# Patient Record
Sex: MALE | Race: WHITE | NOT HISPANIC OR LATINO | Employment: UNEMPLOYED
[De-identification: names, ages, dates, MRNs, and addresses within clinical notes are randomized per-mention and may not be internally consistent; named-entity substitution may affect disease eponyms.]

---

## 2023-07-07 ENCOUNTER — OFFICE VISIT (OUTPATIENT)
Dept: MEDICAL GROUP | Facility: CLINIC | Age: 53
End: 2023-07-07
Payer: MEDICAID

## 2023-07-07 ENCOUNTER — APPOINTMENT (OUTPATIENT)
Dept: MEDICAL GROUP | Facility: CLINIC | Age: 53
End: 2023-07-07
Payer: MEDICAID

## 2023-07-07 VITALS
WEIGHT: 201.7 LBS | TEMPERATURE: 97.8 F | HEIGHT: 68 IN | HEART RATE: 99 BPM | OXYGEN SATURATION: 93 % | SYSTOLIC BLOOD PRESSURE: 164 MMHG | DIASTOLIC BLOOD PRESSURE: 125 MMHG | BODY MASS INDEX: 30.57 KG/M2

## 2023-07-07 DIAGNOSIS — R53.82 CHRONIC FATIGUE: ICD-10-CM

## 2023-07-07 DIAGNOSIS — I10 ESSENTIAL HYPERTENSION: ICD-10-CM

## 2023-07-07 DIAGNOSIS — F17.200 TOBACCO USE DISORDER: ICD-10-CM

## 2023-07-07 DIAGNOSIS — F10.90 ALCOHOL USE DISORDER: ICD-10-CM

## 2023-07-07 PROCEDURE — 3077F SYST BP >= 140 MM HG: CPT | Performed by: HEALTH CARE PROVIDER

## 2023-07-07 PROCEDURE — 99203 OFFICE O/P NEW LOW 30 MIN: CPT | Mod: GE | Performed by: HEALTH CARE PROVIDER

## 2023-07-07 PROCEDURE — 3080F DIAST BP >= 90 MM HG: CPT | Performed by: HEALTH CARE PROVIDER

## 2023-07-07 RX ORDER — LOSARTAN POTASSIUM 25 MG/1
25 TABLET ORAL DAILY
Qty: 30 TABLET | Refills: 0 | Status: SHIPPED | OUTPATIENT
Start: 2023-07-07 | End: 2023-08-11

## 2023-07-07 ASSESSMENT — PATIENT HEALTH QUESTIONNAIRE - PHQ9: CLINICAL INTERPRETATION OF PHQ2 SCORE: 0

## 2023-07-07 NOTE — PROGRESS NOTES
"  [facility]    Subjective:     CC: Elevated BP / Establish Care    HPI:   Jimenez is a 53 y.o. male with no known past medical history presenting today to establish care. He was recently seen in [facility] ER on 6/25 and noted to have elevated BP as well as notable labs including elevated LFTs, elevated Cr, elevated glucose. He describes several weeks of increased fatigue and occasional lightheadedness. Since ER visit, he has decreased alcohol intake from 4-5 alcoholic beverages daily to 2-3 beverages in past 2 weeks. Long history of tobacco use, 1 ppd x 40+ years, decreased to 2-3 cigarettes daily.      Current Outpatient Medications Ordered in Epic   Medication Sig Dispense Refill    losartan (COZAAR) 25 MG Tab Take 1 Tablet by mouth every day. 30 Tablet 0     No current Epic-ordered facility-administered medications on file.         ROS:  Gen: no fevers/chills, no changes in weight  Eyes: no changes in vision  ENT: no sore throat, no hearing loss, no bloody nose  Pulm: no sob, no cough  CV: no chest pain, no palpitations  GI: no nausea/vomiting, no diarrhea  : no dysuria  MSk: no myalgias  Skin: no rash  Neuro: no headaches, no numbness/tingling  Heme/Lymph: no easy bruising      Objective:     Exam:  BP (!) 164/125 (BP Location: Right arm, Patient Position: Sitting, BP Cuff Size: Adult)   Pulse 99   Temp 36.6 °C (97.8 °F) (Temporal)   Ht 1.727 m (5' 8\")   Wt 91.5 kg (201 lb 11.2 oz)   SpO2 93%   BMI 30.67 kg/m²  Body mass index is 30.67 kg/m².    Gen:  Alert and oriented, No apparent distress.  HEENT: Neck is supple without lymphadenopathy, EOMI, no pharyngeal erythema or exudate  Lungs:  Normal effort, CTA bilaterally, no wheezes, rhonchi, or rales  CV:  Regular rate and rhythm. No murmurs, rubs, or gallops.  Abd:      Soft, non-tender, non-distended  Ext:  No clubbing, cyanosis, edema.      Labs:     No results found for this or any previous visit.      Assessment & Plan:     53 y.o. male " with the following -     Problem List Items Addressed This Visit       Essential hypertension     Presents with elevated /125 and multiple home readings with DBP > 110. Notes fatigue and occasional lightheadedness but no headache, vision changes, weakness. Will initiate losartan 25 mg at this time, did not tolerate informal trial of lisinopril in recent weeks. Recommend continued home BP measurements with goal less than 140/90. Will RTC in 2 weeks for continued titration and review of lab results.         Relevant Medications    losartan (COZAAR) 25 MG Tab    Other Relevant Orders    CBC WITH DIFFERENTIAL    Comp Metabolic Panel    TSH WITH REFLEX TO FT4    HEMOGLOBIN A1C    Lipid Profile    Chronic fatigue     Notes recent exacerbation of fatigue. May be secondary to persistent HTN but will perform further investigation including CBC, CMP, A1c, TSH and recommend screening colonoscopy. Will review labs at next appt in 2 weeks.              Follow up in 2 weeks      Bin Rodriguez MD, [place]  [facility]  PGY-3

## 2023-07-07 NOTE — ASSESSMENT & PLAN NOTE
Notes recent exacerbation of fatigue. May be secondary to persistent HTN but will perform further investigation including CBC, CMP, A1c, TSH and recommend screening colonoscopy. Will review labs at next appt in 2 weeks.

## 2023-07-07 NOTE — LETTER
[facility]     July 7, 2023    Patient: Jimenez Gandara   YOB: 1970   Date of Visit: 7/7/2023       To Whom It May Concern:    Jimenez Gandara was seen and treated in our department on 7/7/2023. He is appropriate to return to work at this time. No operating heavy machinery. Lifting heavy items appropriate as tolerated.    Sincerely,     Bin Rodriguez M.D.

## 2023-07-07 NOTE — ASSESSMENT & PLAN NOTE
Presents with elevated /125 and multiple home readings with DBP > 110. Notes fatigue and occasional lightheadedness but no headache, vision changes, weakness. Will initiate losartan 25 mg at this time, did not tolerate informal trial of lisinopril in recent weeks. Recommend continued home BP measurements with goal less than 140/90. Will RTC in 2 weeks for continued titration and review of lab results.

## 2023-07-11 LAB — HBA1C MFR BLD: 6.3 % (ref ?–5.8)

## 2023-07-14 ENCOUNTER — OFFICE VISIT (OUTPATIENT)
Dept: MEDICAL GROUP | Facility: CLINIC | Age: 53
End: 2023-07-14
Payer: MEDICAID

## 2023-07-14 VITALS
RESPIRATION RATE: 16 BRPM | OXYGEN SATURATION: 91 % | HEART RATE: 96 BPM | DIASTOLIC BLOOD PRESSURE: 137 MMHG | TEMPERATURE: 97 F | BODY MASS INDEX: 30.31 KG/M2 | WEIGHT: 200 LBS | HEIGHT: 68 IN | SYSTOLIC BLOOD PRESSURE: 181 MMHG

## 2023-07-14 DIAGNOSIS — F10.90 ALCOHOL USE DISORDER: ICD-10-CM

## 2023-07-14 DIAGNOSIS — I10 HYPERTENSION, UNSPECIFIED TYPE: ICD-10-CM

## 2023-07-14 DIAGNOSIS — F17.200 TOBACCO USE DISORDER: ICD-10-CM

## 2023-07-14 DIAGNOSIS — R55 SYNCOPE, UNSPECIFIED SYNCOPE TYPE: ICD-10-CM

## 2023-07-14 DIAGNOSIS — N28.9 RENAL INSUFFICIENCY: ICD-10-CM

## 2023-07-14 DIAGNOSIS — R42 LIGHT HEADEDNESS: ICD-10-CM

## 2023-07-14 PROCEDURE — 99215 OFFICE O/P EST HI 40 MIN: CPT | Mod: GC

## 2023-07-14 PROCEDURE — 3077F SYST BP >= 140 MM HG: CPT | Mod: GC

## 2023-07-14 PROCEDURE — 3080F DIAST BP >= 90 MM HG: CPT | Mod: GC

## 2023-07-14 NOTE — PROGRESS NOTES
This note is formatted in an APSO format, for additional subjective and objective evaluation please scroll to the bottom of the note.    CC:  Chief Complaint   Patient presents with    Hypertension    Lab Results     Syncopal episode on Monday   Assessment/Plan:  Problem List Items Addressed This Visit       Hypertension     Pt has possibly chronic hypertension, with 20 point increased BP on left compared to right arm. Recent episode of syncope and recent light headedness. DDx essential HTN, secondary HTN, subclavian steal syndrome.  - Will keep losartan 25 due to very high BP  - Not adding another agent today because we cannot completely rule out orthostatic hypotension based on syncopal history.  - ER precautions given  - Pt will hold losartan for SBP below 140 for now.  - Referral to cardiology         Alcohol use disorder     No clear signs of withdrawal 4 days after his last drink. AST and ALT slightly elevated, but PLT, bili normal.         Tobacco use disorder    Syncope     Per patient's history, there seem to be aspects of orthostatic hypotension, or possible vasovagal syncope. However, he has been having increasing light-headedness with exertion which is concerning for cardiac etiology. EKG in the clinic was reassuring. Pt also has apparently chronic hypertension but there is a significant difference of about 20 points systolic BP between the two arms. He reports some calf cramping at night which started as soon as he started taking losartan, but no leg swelling, no new cough with the leg cramping, and patient denies ever having chest pain. Wells score is zero; unlikely PE. No signs of seizure based on HPI, no personal or family hx of seizure.  DDx includes orthostatic hypotension, vasovagal syncope, cardiomyopathy/HF/structural heart disorder, subclavian steal syndrome, COPD.  -ER precautions reviewed with patient  -Echocardiogram ordered  -Consider CT chest, although pt does have Cr=1.8 recent labs and  no baseline.  -Urgent referral to cardiology placed         Renal insufficiency     Elevated Cr in context of HTN. Unknown if it is chronic because we have no baseline Cr.  - Continue to trend Cr         Light headedness     Started about a month ago. Unclear etiology. Please see syncope.           No orders of the defined types were placed in this encounter.      Return in about 1 month (around 8/14/2023).      LABS: Results reviewed and discussed with the patient, questions answered.    HISTORY OF PRESENT ILLNESS: Patient is a 53 y.o. male established patient who presents today with:    Problem   Syncope    Pt had an episode of syncope on 7/10/23 for the first time. He reports that he has been feeling light headed for about a month, especially when he stands up or moves around a lot. He was at work on 7/10, feeling very lightheaded. He lifted a heavy object up and felt his vision go white and increased light-headedness, then he passed out. It was unwitnessed but pt believes that it only lasted a couple seconds. He was not confused upon awakening and went back to work.     Renal Insufficiency    Cr=1.8 /  GFR=42 July 2022. Unclear if EVELYN or CKD.     Light Headedness   Hypertension    Pt with hypertension, likely chronic. Takes BP at home, with SBP ranging 160-200. Home recordings as well as in-clinic measurement show ~20 points higher in the left arm than the right.    Currently HTN poorly controlled with losartan 25.     Alcohol Use Disorder    Pt reports he was a heavy drinker but reduced intake in June 2022. He reports that he quit completed on July 10 2022 after his episode of syncope.     Tobacco Use Disorder    Pt reports he has cut down to 2-3 per day. Started smoking at 13 y/o.         1. Syncope, unspecified syncope type        2. Hypertension, unspecified type        3. Renal insufficiency        4. Alcohol use disorder        5. Tobacco use disorder        6. Light headedness            Patient Active  "Problem List    Diagnosis Date Noted    Syncope 07/14/2023    Renal insufficiency 07/14/2023    Light headedness 07/14/2023    Hypertension 07/07/2023    Chronic fatigue 07/07/2023    Alcohol use disorder 07/07/2023    Tobacco use disorder 07/07/2023      Allergies:Patient has no known allergies.    Current Outpatient Medications   Medication Sig Dispense Refill    losartan (COZAAR) 25 MG Tab Take 1 Tablet by mouth every day. 30 Tablet 0     No current facility-administered medications for this visit.       Social History     Tobacco Use    Smoking status: Every Day     Types: Cigarettes    Smokeless tobacco: Never    Tobacco comments:     3 to 4 cig a day   Vaping Use    Vaping Use: Never used   Substance Use Topics    Alcohol use: Yes     Comment: 4 or 5 shots of liquor a day    Drug use: Never     Social History     Social History Narrative    Not on file       No family history on file.    Exam:    BP (!) 181/137 (BP Location: Left arm, Patient Position: Sitting, BP Cuff Size: Large adult)   Pulse 96   Temp 36.1 °C (97 °F) (Temporal)   Resp 16   Ht 1.727 m (5' 8\")   Wt 90.7 kg (200 lb)   SpO2 91%   BMI 30.41 kg/m²  Body mass index is 30.41 kg/m².    Gen: Alert and oriented, No apparent distress. Well developed  HEENT: NCAT, MMM  Neck: Supple, FROM  Chest: No deformities, Equal chest expansion  Lungs: Normal effort, CTA bilaterally, no wheezes, rhonchi, or rales  CV: Regular rate and rhythm. No murmurs, rubs, or gallops. Pulse palpable  Abd: Non-distended  Ext: No clubbing, cyanosis, edema.  Skin: Warm/dry, without rashes  Neuro: A/O x 4, CN 2-12 Grossly intact, Motor/sensory grossly intact  Psych: Normal behavior, normal affect      Christiano Almazan MD  [facility] Resident    "

## 2023-07-15 NOTE — ASSESSMENT & PLAN NOTE
Elevated Cr in context of HTN. Unknown if it is chronic because we have no baseline Cr.  - Continue to trend Cr

## 2023-07-15 NOTE — ASSESSMENT & PLAN NOTE
No clear signs of withdrawal 4 days after his last drink. AST and ALT slightly elevated, but PLT, bili normal.

## 2023-07-15 NOTE — ASSESSMENT & PLAN NOTE
Pt has possibly chronic hypertension, with 20 point increased BP on left compared to right arm. Recent episode of syncope and recent light headedness. DDx essential HTN, secondary HTN, subclavian steal syndrome.  - Will keep losartan 25 due to very high BP  - Not adding another agent today because we cannot completely rule out orthostatic hypotension based on syncopal history.  - ER precautions given  - Pt will hold losartan for SBP below 140 for now.  - Referral to cardiology

## 2023-07-21 ENCOUNTER — TELEPHONE (OUTPATIENT)
Dept: MEDICAL GROUP | Facility: CLINIC | Age: 53
End: 2023-07-21

## 2023-07-21 NOTE — TELEPHONE ENCOUNTER
Spoke with Glynn and he is feeling lightheaded and dizzy. He only took half a pill. I told him to discontinue taking his medication. An appointment was made for this coming Thursday to see Dr. Aguirre.

## 2023-07-21 NOTE — TELEPHONE ENCOUNTER
Caller Name: Jimenez Gandara   Call Back Number: [phone]     How would the patient prefer to be contacted with a response: Phone call OK to leave a detailed message    Glynn  was instructed to stop taking his Losartan if his BP is under 130. Yesterday on his Right arm it was 126/96 and left arm was 108/82. He doesn't know what to do next. I let him know that Dr. Almazan is out of clinic today, I would ask Dr. Morales for advice and call him back.

## 2023-07-24 ENCOUNTER — OFFICE VISIT (OUTPATIENT)
Dept: MEDICAL GROUP | Facility: CLINIC | Age: 53
End: 2023-07-24
Payer: MEDICAID

## 2023-07-24 VITALS
BODY MASS INDEX: 31.07 KG/M2 | HEIGHT: 68 IN | DIASTOLIC BLOOD PRESSURE: 80 MMHG | SYSTOLIC BLOOD PRESSURE: 132 MMHG | OXYGEN SATURATION: 96 % | HEART RATE: 90 BPM | RESPIRATION RATE: 14 BRPM | TEMPERATURE: 96.7 F | WEIGHT: 205 LBS

## 2023-07-24 DIAGNOSIS — N28.9 RENAL INSUFFICIENCY: ICD-10-CM

## 2023-07-24 DIAGNOSIS — I10 HYPERTENSION, UNSPECIFIED TYPE: ICD-10-CM

## 2023-07-24 DIAGNOSIS — R42 DIZZINESS: ICD-10-CM

## 2023-07-24 DIAGNOSIS — R42 LIGHT HEADEDNESS: ICD-10-CM

## 2023-07-24 PROCEDURE — 99214 OFFICE O/P EST MOD 30 MIN: CPT | Mod: GC

## 2023-07-24 PROCEDURE — 3079F DIAST BP 80-89 MM HG: CPT | Mod: GC

## 2023-07-24 PROCEDURE — 3075F SYST BP GE 130 - 139MM HG: CPT | Mod: GC

## 2023-07-24 NOTE — PROGRESS NOTES
"Subjective:     CC: Follow-up dizziness and high blood pressure    HPI:   Jimenez presents today with for follow-up on 2-week episode of dizziness and very high blood pressure.    Problem   Renal Insufficiency    Cr=1.8  July 2023. Unclear if EVELYN or CKD.     Light Headedness    Patient went to [facility] ER at the end of June 2023 after dizziness and syncopal episode.  Patient has undergone echo with Dr. Peoples, cardiology.  Patient would like those results.  Patient continues to have dizziness was primarily when he stands up, it also occurs when he moves his head and at times when he is just turning around in bed. .  Attests to SOB, denies leg swelling.      Hypertension    Patient syncopal episode was likely hypertensive emergency at the end of June 2023.   Patient has a blood pressure log, it ranges from 108/82-1 69/21.  Left arm is often 5-40 systolic higher.  Patient has only been taking half of his losartan 25 mg due to dizziness concerns         Social Hx: Used to be heavy drinker, has now stopped drinking.    Work: Works in construction, currently laid off due to syncopal episode, pursuing symptom resolution.    Current Outpatient Medications Ordered in Epic   Medication Sig Dispense Refill    losartan (COZAAR) 25 MG Tab Take 1 Tablet by mouth every day. 30 Tablet 0     No current Epic-ordered facility-administered medications on file.       History reviewed. No pertinent past medical history.     History reviewed. No pertinent surgical history.     History reviewed. No pertinent family history.         Objective:     Exam:  /80 (BP Location: Right arm, Patient Position: Standing, BP Cuff Size: Large adult)   Pulse 90   Temp 35.9 °C (96.7 °F) (Temporal)   Resp 14   Ht 1.727 m (5' 8\")   Wt 93 kg (205 lb)   SpO2 96%   BMI 31.17 kg/m²  Body mass index is 31.17 kg/m².    Gen: Alert and oriented, No apparent distress.  Head:  NCAT, EOMI, sclera clear without discharge  Neck: Neck is supple without " lymphadenopathy.  Lungs: Normal effort, CTA bilaterally, no wheezes, rhonchi, or rales  CV: Slightly distant heart sounds.  Regular rate and rhythm. No murmurs, rubs, or gallops.  Abd:   Non-distended, soft  Ext: No clubbing, cyanosis, edema.  MSK: Unassisted gait  Derm: No lesions on exposed skin  Psych: normal mood and affect        Assessment & Plan:     53 y.o. male with the following -     Problem List Items Addressed This Visit       Hypertension     - Recommend patient take a full 25 mg losartan, may follow-up with Dr. Chowdhury for this  -Continue BP log.          Renal insufficiency     Repeat labs given to patient to perform in 1 month.         Light headedness     Echo done 7/23/2023 showed EF 35 to 40% and elevated right pulmonary artery pressure of 50 mmHg.  Bilateral carotid ultrasound showed only mild stenosis (under 50%) on right.  Orthostatics done today and showed systolic drop of 148 to 130 from lying to standing.  Assuming most likely cause is mild HFrEF at this point.    - Follow-up with Dr. Chowdhury on 7/27.  - Instructed patient to address possible tilt table test and/or meclizine with Dr. Chowdhury  -Instructed patient  -Instructed pt to aim to drink at least 3 L of water a day and begin to wear compression stockings to assess for improvement of symptoms.  -vestibular rehab referral given to pt to pursue, may possibly provide some relief for dizziness with head motion component.           Other Visit Diagnoses       Dizziness        Relevant Orders    CBC WITH DIFFERENTIAL    Comp Metabolic Panel    TSH WITH REFLEX TO FT4    Vestibular Rehab            Follow-up: With myself or another provider in 1 month.      Florence Aguirre D.O.  PGY-2    Please note that this dictation was created using voice recognition software. I have made every reasonable attempt to correct obvious errors, but I expect that there are errors of grammar and possibly content that I did not discover before finalizing the note.

## 2023-07-25 NOTE — ASSESSMENT & PLAN NOTE
Echo done 7/23/2023 showed EF 35 to 40% and elevated right pulmonary artery pressure of 50 mmHg.  Bilateral carotid ultrasound showed only mild stenosis (under 50%) on right.  Orthostatics done today and showed systolic drop of 148 to 130 from lying to standing.  Assuming most likely cause is mild HFrEF at this point.    - Follow-up with Dr. Chowdhury on 7/27.  - Instructed patient to address possible tilt table test and/or meclizine with Dr. Chowdhury  -Instructed patient  -Instructed pt to aim to drink at least 3 L of water a day and begin to wear compression stockings to assess for improvement of symptoms.  -vestibular rehab referral given to pt to pursue, may possibly provide some relief for dizziness with head motion component.

## 2023-07-25 NOTE — ASSESSMENT & PLAN NOTE
- Recommend patient take a full 25 mg losartan, may follow-up with Dr. Chowdhury for this  -Continue BP log.

## 2023-07-27 ENCOUNTER — APPOINTMENT (OUTPATIENT)
Dept: MEDICAL GROUP | Facility: CLINIC | Age: 53
End: 2023-07-27

## 2023-11-20 ENCOUNTER — OFFICE VISIT (OUTPATIENT)
Dept: CARDIOLOGY | Facility: MEDICAL CENTER | Age: 53
End: 2023-11-20
Attending: INTERNAL MEDICINE
Payer: MEDICAID

## 2023-11-20 VITALS
HEART RATE: 105 BPM | OXYGEN SATURATION: 94 % | DIASTOLIC BLOOD PRESSURE: 64 MMHG | WEIGHT: 207 LBS | HEIGHT: 68 IN | BODY MASS INDEX: 31.37 KG/M2 | RESPIRATION RATE: 18 BRPM | SYSTOLIC BLOOD PRESSURE: 122 MMHG

## 2023-11-20 DIAGNOSIS — I25.10 CORONARY ARTERY DISEASE DUE TO LIPID RICH PLAQUE: ICD-10-CM

## 2023-11-20 DIAGNOSIS — F10.11 ALCOHOL ABUSE, IN REMISSION: ICD-10-CM

## 2023-11-20 DIAGNOSIS — I50.9 ACC/AHA STAGE C CONGESTIVE HEART FAILURE DUE TO ISCHEMIC CARDIOMYOPATHY (HCC): ICD-10-CM

## 2023-11-20 DIAGNOSIS — I25.83 CORONARY ARTERY DISEASE DUE TO LIPID RICH PLAQUE: ICD-10-CM

## 2023-11-20 DIAGNOSIS — Z72.0 TOBACCO USE: ICD-10-CM

## 2023-11-20 DIAGNOSIS — I25.5 ACC/AHA STAGE C CONGESTIVE HEART FAILURE DUE TO ISCHEMIC CARDIOMYOPATHY (HCC): ICD-10-CM

## 2023-11-20 DIAGNOSIS — R42 LIGHT HEADEDNESS: ICD-10-CM

## 2023-11-20 DIAGNOSIS — I10 HYPERTENSION, UNSPECIFIED TYPE: ICD-10-CM

## 2023-11-20 LAB — EKG IMPRESSION: NORMAL

## 2023-11-20 PROCEDURE — 99205 OFFICE O/P NEW HI 60 MIN: CPT | Performed by: INTERNAL MEDICINE

## 2023-11-20 PROCEDURE — 99202 OFFICE O/P NEW SF 15 MIN: CPT | Performed by: INTERNAL MEDICINE

## 2023-11-20 PROCEDURE — 3074F SYST BP LT 130 MM HG: CPT | Performed by: INTERNAL MEDICINE

## 2023-11-20 PROCEDURE — 3078F DIAST BP <80 MM HG: CPT | Performed by: INTERNAL MEDICINE

## 2023-11-20 PROCEDURE — 93010 ELECTROCARDIOGRAM REPORT: CPT | Performed by: INTERNAL MEDICINE

## 2023-11-20 PROCEDURE — 93005 ELECTROCARDIOGRAM TRACING: CPT | Performed by: INTERNAL MEDICINE

## 2023-11-20 RX ORDER — METOPROLOL SUCCINATE 25 MG/1
12.5 TABLET, EXTENDED RELEASE ORAL DAILY
Qty: 50 TABLET | Refills: 3 | Status: SHIPPED | OUTPATIENT
Start: 2023-11-20 | End: 2024-03-01

## 2023-11-20 RX ORDER — POTASSIUM CHLORIDE 750 MG/1
10 TABLET, FILM COATED, EXTENDED RELEASE ORAL
Qty: 30 TABLET | Refills: 3 | Status: SHIPPED | OUTPATIENT
Start: 2023-11-20 | End: 2024-03-01 | Stop reason: SDUPTHER

## 2023-11-20 RX ORDER — ATORVASTATIN CALCIUM 40 MG/1
TABLET, FILM COATED ORAL
COMMUNITY

## 2023-11-20 RX ORDER — FUROSEMIDE 20 MG/1
20 TABLET ORAL DAILY
COMMUNITY
Start: 2023-10-12 | End: 2023-11-20 | Stop reason: SDUPTHER

## 2023-11-20 RX ORDER — CLOPIDOGREL BISULFATE 75 MG/1
75 TABLET ORAL DAILY
COMMUNITY

## 2023-11-20 RX ORDER — POTASSIUM CHLORIDE 750 MG/1
1 TABLET, FILM COATED, EXTENDED RELEASE ORAL 2 TIMES DAILY
COMMUNITY
Start: 2023-11-13 | End: 2023-11-20 | Stop reason: SDUPTHER

## 2023-11-20 RX ORDER — ASPIRIN 81 MG/1
81 TABLET ORAL DAILY
Qty: 100 TABLET | Refills: 3 | Status: SHIPPED | OUTPATIENT
Start: 2023-11-20

## 2023-11-20 RX ORDER — LOSARTAN POTASSIUM 25 MG/1
25 TABLET ORAL EVERY EVENING
Qty: 90 TABLET | Refills: 3 | Status: ON HOLD | OUTPATIENT
Start: 2023-11-20 | End: 2023-12-11

## 2023-11-20 RX ORDER — FUROSEMIDE 20 MG/1
20 TABLET ORAL
Qty: 30 TABLET | Refills: 3 | Status: SHIPPED | OUTPATIENT
Start: 2023-11-20 | End: 2024-03-01 | Stop reason: SDUPTHER

## 2023-11-20 ASSESSMENT — ENCOUNTER SYMPTOMS
PALPITATIONS: 0
BLURRED VISION: 0
CLAUDICATION: 0
ORTHOPNEA: 0
IRREGULAR HEARTBEAT: 0
SYNCOPE: 0
DYSPNEA ON EXERTION: 0
NEAR-SYNCOPE: 0
BACK PAIN: 0
NAUSEA: 0
WEIGHT LOSS: 0
ALTERED MENTAL STATUS: 0
VOMITING: 0
DECREASED APPETITE: 0
FEVER: 0
PND: 0
DEPRESSION: 0
ABDOMINAL PAIN: 0
SHORTNESS OF BREATH: 0
FLANK PAIN: 0
WEIGHT GAIN: 0
DIZZINESS: 0
CONSTIPATION: 0
COUGH: 0
HEARTBURN: 0
DIARRHEA: 0

## 2023-11-20 ASSESSMENT — FIBROSIS 4 INDEX: FIB4 SCORE: 1.87

## 2023-11-30 ENCOUNTER — TELEPHONE (OUTPATIENT)
Dept: CARDIOLOGY | Facility: MEDICAL CENTER | Age: 53
End: 2023-11-30
Payer: MEDICAID

## 2023-12-01 NOTE — TELEPHONE ENCOUNTER
Phone Number Called: [phone]    Call outcome: Spoke to patient regarding message below.    Message: Called to follow up with patient regarding symptoms.    Patient states he is not feeling better than when he came in and saw VR on 11/20. BP in the AM averaging 140/90's. Patient HR has been in the 80's-90's. Every time patient stands up he gets dizzy. He can walk 15-20 feet and then he has to stop and catch his breath. Patient denies chest pain and palpitations with dyspnea on exertion. Any activity makes him short of breath. Patient confirms the losartan 25 MG dialy. Metoprolol 12.5 MG NOC.     Patient states he had some swelling and he took the water pill for a couple of days and it improved. Patient does not weight himself each day. Patient does not feel like he has any fluid in the abdomen or legs. He does feel like he has some bloating. Patient states he has cut back on his water somewhat, but he drinks 3-4 16oz bottles of water per day.     To VR: Please advise  
Phone Number Called: [phone]    Call outcome: Spoke to patient regarding message below.    Message: Called to inform patient of VR recommendations. Discussed with patient and friend that there are many things the ER can rule out that we cannot get done easily. Patient and friend verbalize understanding.    
Tried to call him but no answer. I don't see his repeat blood work. Probably best served in emergency room. He needs closer monitoring vitals, fluid status, and labs. Thanks Lillian!  
VR        Caller: Liss(pt's friend)    Topic/issue: Patient was in on 11/20 and was prescribed new medications and he is still experiencing fatigue, shortness of breath and dizziness as well even with the new medications. She was concerned and was asking for a call back    Callback Number: [phone]        Thank you    -Pedrito PHILLIPS    
Tear meniscus knee

## 2023-12-10 ENCOUNTER — APPOINTMENT (OUTPATIENT)
Dept: RADIOLOGY | Facility: MEDICAL CENTER | Age: 53
DRG: 291 | End: 2023-12-10
Attending: EMERGENCY MEDICINE
Payer: MEDICAID

## 2023-12-10 ENCOUNTER — HOSPITAL ENCOUNTER (INPATIENT)
Facility: MEDICAL CENTER | Age: 53
LOS: 1 days | DRG: 291 | End: 2023-12-11
Attending: EMERGENCY MEDICINE | Admitting: FAMILY MEDICINE
Payer: MEDICAID

## 2023-12-10 ENCOUNTER — APPOINTMENT (OUTPATIENT)
Dept: CARDIOLOGY | Facility: MEDICAL CENTER | Age: 53
DRG: 291 | End: 2023-12-10
Attending: INTERNAL MEDICINE
Payer: MEDICAID

## 2023-12-10 DIAGNOSIS — I50.9 ACUTE ON CHRONIC CONGESTIVE HEART FAILURE, UNSPECIFIED HEART FAILURE TYPE (HCC): ICD-10-CM

## 2023-12-10 DIAGNOSIS — R09.02 HYPOXIA: ICD-10-CM

## 2023-12-10 PROBLEM — N18.2 STAGE 2 CHRONIC KIDNEY DISEASE: Status: ACTIVE | Noted: 2023-07-14

## 2023-12-10 LAB
ALBUMIN SERPL BCP-MCNC: 4.1 G/DL (ref 3.2–4.9)
ALBUMIN/GLOB SERPL: 1.5 G/DL
ALP SERPL-CCNC: 103 U/L (ref 30–99)
ALT SERPL-CCNC: 47 U/L (ref 2–50)
AMPHET UR QL SCN: POSITIVE
ANION GAP SERPL CALC-SCNC: 13 MMOL/L (ref 7–16)
AST SERPL-CCNC: 41 U/L (ref 12–45)
BARBITURATES UR QL SCN: NEGATIVE
BASOPHILS # BLD AUTO: 0.6 % (ref 0–1.8)
BASOPHILS # BLD: 0.05 K/UL (ref 0–0.12)
BENZODIAZ UR QL SCN: NEGATIVE
BILIRUB SERPL-MCNC: 0.9 MG/DL (ref 0.1–1.5)
BUN SERPL-MCNC: 22 MG/DL (ref 8–22)
BZE UR QL SCN: NEGATIVE
CALCIUM ALBUM COR SERPL-MCNC: 9 MG/DL (ref 8.5–10.5)
CALCIUM SERPL-MCNC: 9.1 MG/DL (ref 8.5–10.5)
CANNABINOIDS UR QL SCN: NEGATIVE
CHLORIDE SERPL-SCNC: 104 MMOL/L (ref 96–112)
CO2 SERPL-SCNC: 21 MMOL/L (ref 20–33)
CREAT SERPL-MCNC: 1.5 MG/DL (ref 0.5–1.4)
CREAT UR-MCNC: 51.43 MG/DL
D DIMER PPP IA.FEU-MCNC: 0.56 UG/ML (FEU) (ref 0–0.5)
EKG IMPRESSION: NORMAL
EOSINOPHIL # BLD AUTO: 0.17 K/UL (ref 0–0.51)
EOSINOPHIL NFR BLD: 2 % (ref 0–6.9)
ERYTHROCYTE [DISTWIDTH] IN BLOOD BY AUTOMATED COUNT: 45.3 FL (ref 35.9–50)
FENTANYL UR QL: NEGATIVE
GFR SERPLBLD CREATININE-BSD FMLA CKD-EPI: 54 ML/MIN/1.73 M 2
GLOBULIN SER CALC-MCNC: 2.7 G/DL (ref 1.9–3.5)
GLUCOSE SERPL-MCNC: 185 MG/DL (ref 65–99)
HCT VFR BLD AUTO: 47.3 % (ref 42–52)
HGB BLD-MCNC: 16.2 G/DL (ref 14–18)
IMM GRANULOCYTES # BLD AUTO: 0.04 K/UL (ref 0–0.11)
IMM GRANULOCYTES NFR BLD AUTO: 0.5 % (ref 0–0.9)
LV EJECT FRACT  99904: 59
LV EJECT FRACT MOD 2C 99903: 54.87
LV EJECT FRACT MOD 4C 99902: 60.53
LV EJECT FRACT MOD BP 99901: 58.89
LYMPHOCYTES # BLD AUTO: 2.01 K/UL (ref 1–4.8)
LYMPHOCYTES NFR BLD: 23.8 % (ref 22–41)
MCH RBC QN AUTO: 29.2 PG (ref 27–33)
MCHC RBC AUTO-ENTMCNC: 34.2 G/DL (ref 32.3–36.5)
MCV RBC AUTO: 85.2 FL (ref 81.4–97.8)
METHADONE UR QL SCN: NEGATIVE
MICROALBUMIN UR-MCNC: 8.5 MG/DL
MICROALBUMIN/CREAT UR: 165 MG/G (ref 0–30)
MONOCYTES # BLD AUTO: 0.86 K/UL (ref 0–0.85)
MONOCYTES NFR BLD AUTO: 10.2 % (ref 0–13.4)
NEUTROPHILS # BLD AUTO: 5.33 K/UL (ref 1.82–7.42)
NEUTROPHILS NFR BLD: 62.9 % (ref 44–72)
NRBC # BLD AUTO: 0 K/UL
NRBC BLD-RTO: 0 /100 WBC (ref 0–0.2)
NT-PROBNP SERPL IA-MCNC: 3294 PG/ML (ref 0–125)
OPIATES UR QL SCN: NEGATIVE
OXYCODONE UR QL SCN: NEGATIVE
PCP UR QL SCN: NEGATIVE
PLATELET # BLD AUTO: 176 K/UL (ref 164–446)
PMV BLD AUTO: 11.2 FL (ref 9–12.9)
POTASSIUM SERPL-SCNC: 4 MMOL/L (ref 3.6–5.5)
PROPOXYPH UR QL SCN: NEGATIVE
PROT SERPL-MCNC: 6.8 G/DL (ref 6–8.2)
RBC # BLD AUTO: 5.55 M/UL (ref 4.7–6.1)
SODIUM SERPL-SCNC: 138 MMOL/L (ref 135–145)
TROPONIN T SERPL-MCNC: 21 NG/L (ref 6–19)
TROPONIN T SERPL-MCNC: 25 NG/L (ref 6–19)
WBC # BLD AUTO: 8.5 K/UL (ref 4.8–10.8)

## 2023-12-10 PROCEDURE — 770020 HCHG ROOM/CARE - TELE (206)

## 2023-12-10 PROCEDURE — A9270 NON-COVERED ITEM OR SERVICE: HCPCS | Performed by: HEALTH CARE PROVIDER

## 2023-12-10 PROCEDURE — 80307 DRUG TEST PRSMV CHEM ANLYZR: CPT

## 2023-12-10 PROCEDURE — 82043 UR ALBUMIN QUANTITATIVE: CPT

## 2023-12-10 PROCEDURE — 85379 FIBRIN DEGRADATION QUANT: CPT

## 2023-12-10 PROCEDURE — 93306 TTE W/DOPPLER COMPLETE: CPT

## 2023-12-10 PROCEDURE — 84484 ASSAY OF TROPONIN QUANT: CPT

## 2023-12-10 PROCEDURE — 93306 TTE W/DOPPLER COMPLETE: CPT | Mod: 26 | Performed by: INTERNAL MEDICINE

## 2023-12-10 PROCEDURE — 96374 THER/PROPH/DIAG INJ IV PUSH: CPT

## 2023-12-10 PROCEDURE — 93005 ELECTROCARDIOGRAM TRACING: CPT | Performed by: EMERGENCY MEDICINE

## 2023-12-10 PROCEDURE — 71045 X-RAY EXAM CHEST 1 VIEW: CPT

## 2023-12-10 PROCEDURE — 85025 COMPLETE CBC W/AUTO DIFF WBC: CPT

## 2023-12-10 PROCEDURE — 99254 IP/OBS CNSLTJ NEW/EST MOD 60: CPT | Performed by: INTERNAL MEDICINE

## 2023-12-10 PROCEDURE — 83880 ASSAY OF NATRIURETIC PEPTIDE: CPT

## 2023-12-10 PROCEDURE — 700102 HCHG RX REV CODE 250 W/ 637 OVERRIDE(OP): Performed by: HEALTH CARE PROVIDER

## 2023-12-10 PROCEDURE — A9270 NON-COVERED ITEM OR SERVICE: HCPCS | Performed by: INTERNAL MEDICINE

## 2023-12-10 PROCEDURE — 82570 ASSAY OF URINE CREATININE: CPT

## 2023-12-10 PROCEDURE — 96375 TX/PRO/DX INJ NEW DRUG ADDON: CPT

## 2023-12-10 PROCEDURE — 700111 HCHG RX REV CODE 636 W/ 250 OVERRIDE (IP): Mod: JZ,UD | Performed by: INTERNAL MEDICINE

## 2023-12-10 PROCEDURE — 93005 ELECTROCARDIOGRAM TRACING: CPT

## 2023-12-10 PROCEDURE — 99285 EMERGENCY DEPT VISIT HI MDM: CPT

## 2023-12-10 PROCEDURE — 80053 COMPREHEN METABOLIC PANEL: CPT

## 2023-12-10 PROCEDURE — 36415 COLL VENOUS BLD VENIPUNCTURE: CPT

## 2023-12-10 PROCEDURE — 700102 HCHG RX REV CODE 250 W/ 637 OVERRIDE(OP): Performed by: INTERNAL MEDICINE

## 2023-12-10 RX ORDER — ATORVASTATIN CALCIUM 40 MG/1
40 TABLET, FILM COATED ORAL EVERY EVENING
Status: DISCONTINUED | OUTPATIENT
Start: 2023-12-10 | End: 2023-12-11 | Stop reason: HOSPADM

## 2023-12-10 RX ORDER — ASPIRIN 81 MG/1
81 TABLET ORAL DAILY
Status: DISCONTINUED | OUTPATIENT
Start: 2023-12-11 | End: 2023-12-11 | Stop reason: HOSPADM

## 2023-12-10 RX ORDER — HYDRALAZINE HYDROCHLORIDE 20 MG/ML
10 INJECTION INTRAMUSCULAR; INTRAVENOUS EVERY 4 HOURS PRN
Status: DISCONTINUED | OUTPATIENT
Start: 2023-12-10 | End: 2023-12-11 | Stop reason: HOSPADM

## 2023-12-10 RX ORDER — BISACODYL 10 MG
10 SUPPOSITORY, RECTAL RECTAL
Status: DISCONTINUED | OUTPATIENT
Start: 2023-12-10 | End: 2023-12-11 | Stop reason: HOSPADM

## 2023-12-10 RX ORDER — ENOXAPARIN SODIUM 100 MG/ML
40 INJECTION SUBCUTANEOUS DAILY
Status: DISCONTINUED | OUTPATIENT
Start: 2023-12-10 | End: 2023-12-11 | Stop reason: HOSPADM

## 2023-12-10 RX ORDER — HYDRALAZINE HYDROCHLORIDE 20 MG/ML
20 INJECTION INTRAMUSCULAR; INTRAVENOUS ONCE
Status: COMPLETED | OUTPATIENT
Start: 2023-12-10 | End: 2023-12-10

## 2023-12-10 RX ORDER — ACETAMINOPHEN 325 MG/1
650 TABLET ORAL EVERY 6 HOURS PRN
Status: DISCONTINUED | OUTPATIENT
Start: 2023-12-10 | End: 2023-12-11 | Stop reason: HOSPADM

## 2023-12-10 RX ORDER — ONDANSETRON 2 MG/ML
4 INJECTION INTRAMUSCULAR; INTRAVENOUS EVERY 4 HOURS PRN
Status: DISCONTINUED | OUTPATIENT
Start: 2023-12-10 | End: 2023-12-11 | Stop reason: HOSPADM

## 2023-12-10 RX ORDER — NICOTINE 21 MG/24HR
14 PATCH, TRANSDERMAL 24 HOURS TRANSDERMAL
Status: DISCONTINUED | OUTPATIENT
Start: 2023-12-11 | End: 2023-12-10

## 2023-12-10 RX ORDER — NICOTINE 21 MG/24HR
14 PATCH, TRANSDERMAL 24 HOURS TRANSDERMAL
Status: DISCONTINUED | OUTPATIENT
Start: 2023-12-10 | End: 2023-12-11 | Stop reason: HOSPADM

## 2023-12-10 RX ORDER — PROMETHAZINE HYDROCHLORIDE 25 MG/1
12.5-25 SUPPOSITORY RECTAL EVERY 4 HOURS PRN
Status: DISCONTINUED | OUTPATIENT
Start: 2023-12-10 | End: 2023-12-11 | Stop reason: HOSPADM

## 2023-12-10 RX ORDER — HYDRALAZINE HYDROCHLORIDE 10 MG/1
10 TABLET, FILM COATED ORAL EVERY 8 HOURS
Status: DISCONTINUED | OUTPATIENT
Start: 2023-12-10 | End: 2023-12-11

## 2023-12-10 RX ORDER — CLOPIDOGREL BISULFATE 75 MG/1
75 TABLET ORAL DAILY
Status: DISCONTINUED | OUTPATIENT
Start: 2023-12-11 | End: 2023-12-11 | Stop reason: HOSPADM

## 2023-12-10 RX ORDER — POLYETHYLENE GLYCOL 3350 17 G/17G
1 POWDER, FOR SOLUTION ORAL
Status: DISCONTINUED | OUTPATIENT
Start: 2023-12-10 | End: 2023-12-11 | Stop reason: HOSPADM

## 2023-12-10 RX ORDER — PROCHLORPERAZINE EDISYLATE 5 MG/ML
5-10 INJECTION INTRAMUSCULAR; INTRAVENOUS EVERY 4 HOURS PRN
Status: DISCONTINUED | OUTPATIENT
Start: 2023-12-10 | End: 2023-12-11 | Stop reason: HOSPADM

## 2023-12-10 RX ORDER — LORAZEPAM 0.5 MG/1
0.5 TABLET ORAL
Status: DISCONTINUED | OUTPATIENT
Start: 2023-12-10 | End: 2023-12-11 | Stop reason: HOSPADM

## 2023-12-10 RX ORDER — ONDANSETRON 4 MG/1
4 TABLET, ORALLY DISINTEGRATING ORAL EVERY 4 HOURS PRN
Status: DISCONTINUED | OUTPATIENT
Start: 2023-12-10 | End: 2023-12-11 | Stop reason: HOSPADM

## 2023-12-10 RX ORDER — AMOXICILLIN 250 MG
2 CAPSULE ORAL 2 TIMES DAILY
Status: DISCONTINUED | OUTPATIENT
Start: 2023-12-10 | End: 2023-12-11 | Stop reason: HOSPADM

## 2023-12-10 RX ORDER — PROMETHAZINE HYDROCHLORIDE 25 MG/1
12.5-25 TABLET ORAL EVERY 4 HOURS PRN
Status: DISCONTINUED | OUTPATIENT
Start: 2023-12-10 | End: 2023-12-11 | Stop reason: HOSPADM

## 2023-12-10 RX ORDER — FUROSEMIDE 10 MG/ML
20 INJECTION INTRAMUSCULAR; INTRAVENOUS ONCE
Status: COMPLETED | OUTPATIENT
Start: 2023-12-10 | End: 2023-12-10

## 2023-12-10 RX ADMIN — SACUBITRIL AND VALSARTAN 1 TABLET: 24; 26 TABLET, FILM COATED ORAL at 18:46

## 2023-12-10 RX ADMIN — NICOTINE 14 MG: 14 PATCH TRANSDERMAL at 19:22

## 2023-12-10 RX ADMIN — HYDRALAZINE HYDROCHLORIDE 20 MG: 20 INJECTION, SOLUTION INTRAMUSCULAR; INTRAVENOUS at 17:29

## 2023-12-10 RX ADMIN — HYDRALAZINE HYDROCHLORIDE 10 MG: 10 TABLET, FILM COATED ORAL at 17:29

## 2023-12-10 RX ADMIN — HYDRALAZINE HYDROCHLORIDE 10 MG: 10 TABLET, FILM COATED ORAL at 21:51

## 2023-12-10 RX ADMIN — ATORVASTATIN CALCIUM 40 MG: 40 TABLET, FILM COATED ORAL at 18:42

## 2023-12-10 RX ADMIN — FUROSEMIDE 20 MG: 10 INJECTION, SOLUTION INTRAMUSCULAR; INTRAVENOUS at 17:30

## 2023-12-10 ASSESSMENT — LIFESTYLE VARIABLES
EVER FELT BAD OR GUILTY ABOUT YOUR DRINKING: NO
ON A TYPICAL DAY WHEN YOU DRINK ALCOHOL HOW MANY DRINKS DO YOU HAVE: 0
DOES PATIENT WANT TO STOP DRINKING: NO
TOTAL SCORE: 0
ALCOHOL_USE: NO
CONSUMPTION TOTAL: NEGATIVE
HOW MANY TIMES IN THE PAST YEAR HAVE YOU HAD 5 OR MORE DRINKS IN A DAY: 0
TOTAL SCORE: 0
HAVE PEOPLE ANNOYED YOU BY CRITICIZING YOUR DRINKING: NO
HAVE YOU EVER FELT YOU SHOULD CUT DOWN ON YOUR DRINKING: NO
EVER HAD A DRINK FIRST THING IN THE MORNING TO STEADY YOUR NERVES TO GET RID OF A HANGOVER: NO
AVERAGE NUMBER OF DAYS PER WEEK YOU HAVE A DRINK CONTAINING ALCOHOL: 0
TOTAL SCORE: 0

## 2023-12-10 ASSESSMENT — COGNITIVE AND FUNCTIONAL STATUS - GENERAL
SUGGESTED CMS G CODE MODIFIER DAILY ACTIVITY: CH
DAILY ACTIVITIY SCORE: 24
MOBILITY SCORE: 24
SUGGESTED CMS G CODE MODIFIER MOBILITY: CH

## 2023-12-10 ASSESSMENT — PATIENT HEALTH QUESTIONNAIRE - PHQ9
1. LITTLE INTEREST OR PLEASURE IN DOING THINGS: NOT AT ALL
SUM OF ALL RESPONSES TO PHQ9 QUESTIONS 1 AND 2: 0
2. FEELING DOWN, DEPRESSED, IRRITABLE, OR HOPELESS: NOT AT ALL

## 2023-12-10 ASSESSMENT — FIBROSIS 4 INDEX
FIB4 SCORE: 1.87
FIB4 SCORE: 1.87

## 2023-12-10 NOTE — ED PROVIDER NOTES
"ED Provider Note    CHIEF COMPLAINT  Chief Complaint   Patient presents with    Sent by MD     Cardiologist Dr. Alcazar- recommended ED visit (thursday 12/7/23) d/t dizziness, lightheaded since 7/2023.  Hx stents placed 10/2023.    Dizziness     Constant since 7/7/23.       EXTERNAL RECORDS REVIEWED  Outside records show the patient's last cardiology visit appears to have been 20 days ago or so.  At that time, he was complaining of dizziness and shortness of breath with minimal exertion, as well as orthopnea.  He reported resolution of edema, attributed to diuretics.  He reported syncope after Coreg at the time of that visit.  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  July 2023 echo showed an ejection fraction of 35 to 40%.  Left heart cath in October of this year showed mid LAD disease with successful stenting.  The plan was to use losartan once daily instead of half of the same dose twice daily, start metoprolol in the morning, reduce Lasix to as needed, as hypovolemia was suspected as the cause of the patient's dizziness.  Consideration being given to ICD if ejection fraction becomes worse than 35%.       HPI/ROS  LIMITATION TO HISTORY       OUTSIDE HISTORIAN(S):      Jimenez Gandara is a 53 y.o. male who presents to the emergency department reporting dizziness since July.  He said it did not get better after stent placement in October.  The dizziness has been attributed to medication changes at different times, to his CHF, possibly to polysubstance use/abuse.  He has not used meth in months, he says.  He reports that he has stopped drinking as well.  He has worked to decrease his smoking, and says he is down to around 3 cigarettes/day.  He reports that he cannot sleep lying flat, but cannot walk more than 15 feet \"without stopping to catch my breath.\"  Oxygen saturation is 89% at rest, and will be " "checked during ambulation.  No report of fevers, chills, chest pain, nausea or vomiting.    PAST MEDICAL HISTORY   has a past medical history of Congestive heart failure (HCC), heart artery stent (10/2023), and Hypertension.    SURGICAL HISTORY   has a past surgical history that includes other cardiac surgery.    FAMILY HISTORY  History reviewed. No pertinent family history.    SOCIAL HISTORY  Social History     Tobacco Use    Smoking status: Every Day     Current packs/day: 0.25     Average packs/day: 0.3 packs/day for 15.0 years (3.8 ttl pk-yrs)     Types: Cigarettes    Smokeless tobacco: Never    Tobacco comments:     3 to 4 cig a day   Vaping Use    Vaping Use: Never used   Substance and Sexual Activity    Alcohol use: Not Currently     Comment: 4 or 5 shots of liquor a day    Drug use: Never    Sexual activity: Not on file       CURRENT MEDICATIONS  Home Medications       Reviewed by Deena Rodriguez R.N. (Registered Nurse) on 12/10/23 at 1435  Med List Status: Partial     Medication Last Dose Status   aspirin 81 MG EC tablet  Active   atorvastatin (LIPITOR) 40 MG Tab  Active   clopidogrel (PLAVIX) 75 MG Tab  Active   furosemide (LASIX) 20 MG Tab  Active   losartan (COZAAR) 25 MG Tab  Active   metoprolol SR (TOPROL XL) 25 MG TABLET SR 24 HR  Active   potassium chloride ER (KLOR-CON) 10 MEQ tablet  Active                    ALLERGIES  No Known Allergies      PHYSICAL EXAM  VITAL SIGNS: BP (!) 141/97   Pulse 90   Temp 36.1 °C (97 °F) (Temporal)   Resp 15   Ht 1.727 m (5' 8\")   Wt 99.7 kg (219 lb 12.8 oz)   SpO2 91%   BMI 33.42 kg/m²   Pulse ox interpretation: I interpret this pulse ox as normal.  Constitutional: Alert in no apparent distress.  HENT: No signs of trauma, Bilateral external ears normal, Nose normal.   Eyes: Conjunctiva normal, Non-icteric.   Neck: Normal range of motion, Supple, No stridor.   Lymphatic: No lymphadenopathy noted.   Cardiovascular: Regular rate and rhythm, no murmurs.   Thorax & " Lungs: Decreased breath sounds at bilateral bases, No respiratory distress, No wheezing  Abdomen: Bowel sounds normal, Soft, No tenderness, No masses, No pulsatile masses. No peritoneal signs.  Skin: Warm, Dry, No erythema, No rash.   Extremities: Intact distal pulses, No edema, No cyanosis.  Musculoskeletal: Good range of motion in all major joints. No or major deformities noted.   Neurologic: Alert , Normal motor function, Normal sensory function, No focal deficits noted.   Psychiatric: Affect normal, Judgment normal, Mood normal.       DIAGNOSTIC STUDIES / PROCEDURES  EKG  I have independently interpreted this EKG  Results for orders placed or performed during the hospital encounter of 12/10/23   EKG   Result Value Ref Range    Report       [facility]    Test Date:  2023-12-10  Pt Name:    BLAS RICHARDSON                  Department: ER  MRN:        [id number]                      Room:  Gender:     Male                         Technician: [id number]  :        1970                   Requested By:ER TRIAGE PROTOCOL  Order #:    [id number]                    Reading MD:    Measurements  Intervals                                Axis  Rate:       92                           P:          59  VT:         195                          QRS:        125  QRSD:       86                           T:          103  QT:         408  QTc:        505    Interpretive Statements  Sinus rhythm  Left posterior fascicular block  Nonspecific T abnormalities, lateral leads  Prolonged QT interval  Compared to ECG 2023 14:31:28  Left posterior fascicular block now present  Prolonged QT interval now present  T-wave abnormality still present           LABS  Labs Reviewed   CBC WITH DIFFERENTIAL - Abnormal; Notable for the following components:       Result Value    Monos (Absolute) 0.86 (*)     All other components within normal limits    Narrative:     Biotin intake of greater than 5 mg per day may  interfere with  troponin levels, causing false low values.   COMP METABOLIC PANEL - Abnormal; Notable for the following components:    Glucose 185 (*)     Creatinine 1.50 (*)     Alkaline Phosphatase 103 (*)     All other components within normal limits    Narrative:     Biotin intake of greater than 5 mg per day may interfere with  troponin levels, causing false low values.   TROPONIN - Abnormal; Notable for the following components:    Troponin T 25 (*)     All other components within normal limits    Narrative:     Biotin intake of greater than 5 mg per day may interfere with  troponin levels, causing false low values.   PROBRAIN NATRIURETIC PEPTIDE, NT - Abnormal; Notable for the following components:    NT-proBNP 3294 (*)     All other components within normal limits    Narrative:     Biotin intake of greater than 5 mg per day may interfere with  troponin levels, causing false low values.   ESTIMATED GFR - Abnormal; Notable for the following components:    GFR (CKD-EPI) 54 (*)     All other components within normal limits    Narrative:     Biotin intake of greater than 5 mg per day may interfere with  troponin levels, causing false low values.   TROPONIN   URINE DRUG SCREEN         RADIOLOGY  I have independently interpreted the diagnostic imaging associated with this visit and am waiting the final reading from the radiologist.   My preliminary interpretation is as follows: no infiltrate. Enlarged heart.      Radiologist interpretation:   DX-CHEST-PORTABLE (1 VIEW)   Final Result      1.  Small right pleural effusion and associated atelectasis.   2.  Cardiomegaly.         EC-ECHOCARDIOGRAM COMPLETE W/O CONT    (Results Pending)       COURSE & MEDICAL DECISION MAKING    ED Observation Status? Yes; I am placing the patient in to an observation status due to a diagnostic uncertainty as well as therapeutic intensity. Patient placed in observation status at 3:25 PM, 12/10/2023.     Observation plan is as follows:  Labs, chest x-ray, ambulatory pulse ox, orthostatic vital signs, reassessment.    Upon Reevaluation, the patient's condition has: not improved; and will be escalated to hospitalization.    Patient discharged from ED Observation status at 12/10/23 (Time) 5: 07 PM   (Date).     INITIAL ASSESSMENT, COURSE AND PLAN  Care Narrative:     3:25 PM patient evaluated the bedside.  His complaint is dizziness, unchanged since July.  He reports that his cardiologist recommended that he come to the emergency department 3 days ago.  I do not see a visit or message exchange in that timeframe, with the last visit to cardiology being November 20, 2023.  The patient describes some symptoms that are concerning for progressive heart failure, most especially that he cannot walk greater than 15 feet without stopping to catch his breath.  He has known congestive heart failure.     4:05 PM I have let the patient's cardiologist, Dr. Lyn know that the patient is in the emergency department with tests pending.    4:49 PM patient's troponin is very slightly elevated, though unlikely to represent ACS, since the patient does not have chest pain.  On the other hand, his BNP is extremely elevated, greater than 3000.  He has been evaluated at the bedside by cardiology, who feels that his blood pressure is too high, representing excessive afterload, which should be reduced to help with the patient's CHF.  Cardiology will adjust medications, and requested admission to the hospitalist service.  I have submitted a bed request     5:07 PM [facility] family medicine service agrees to admit.  We reviewed the plan of care, including medication adjustment with cardiology.         ADDITIONAL PROBLEM LIST  Chronic as well as acute congestive heart failure.    DISPOSITION AND DISCUSSIONS  I have discussed management of the patient with the following physicians and MARYAM's: Cardiology, as above, and the admitting hospitalist, who understands the plan for medication  management as guided by cardiology, and respiratory support as needed due to the patient's shortness of breath and exertional hypoxia.    FINAL DIAGNOSIS  1. Acute on chronic congestive heart failure, unspecified heart failure type (HCC)    2. Hypoxia           Electronically signed by: Rosas Brown M.D., 12/10/2023 3:26 PM

## 2023-12-10 NOTE — ED TRIAGE NOTES
Chief Complaint   Patient presents with    Sent by MD     Cardiologist Dr. Alcazar- recommended ED visit (thursday 12/7/23) d/t dizziness, lightheaded since 7/2023.  Hx stents placed 10/2023.    Dizziness     Constant since 7/7/23.     Patient to triage via wheelchair, patient A&O x4.  Appropriate precautions in place.     Patient reports cardiac hx of stent placement in 10/2023, followed by cardiologist, takes medication as prescribed.  Reports onset of constant dizziness since 7/2023, at recent consult with cardiologist was told to report to the ED, patient reporting 3 days after recommendation.    Explained wait time and triage process to pt. Pt placed back in lobby, told to notify ED tech or triage RN of any changes, verbalized understanding.

## 2023-12-11 ENCOUNTER — APPOINTMENT (OUTPATIENT)
Dept: RADIOLOGY | Facility: MEDICAL CENTER | Age: 53
DRG: 291 | End: 2023-12-11
Attending: INTERNAL MEDICINE
Payer: MEDICAID

## 2023-12-11 VITALS
WEIGHT: 217.15 LBS | HEART RATE: 90 BPM | BODY MASS INDEX: 32.91 KG/M2 | OXYGEN SATURATION: 92 % | HEIGHT: 68 IN | RESPIRATION RATE: 16 BRPM | TEMPERATURE: 98.1 F | SYSTOLIC BLOOD PRESSURE: 137 MMHG | DIASTOLIC BLOOD PRESSURE: 78 MMHG

## 2023-12-11 LAB
ALBUMIN SERPL BCP-MCNC: 3.9 G/DL (ref 3.2–4.9)
ALBUMIN/GLOB SERPL: 1.3 G/DL
ALP SERPL-CCNC: 87 U/L (ref 30–99)
ALT SERPL-CCNC: 49 U/L (ref 2–50)
ANION GAP SERPL CALC-SCNC: 14 MMOL/L (ref 7–16)
AST SERPL-CCNC: 49 U/L (ref 12–45)
BASOPHILS # BLD AUTO: 0.4 % (ref 0–1.8)
BASOPHILS # BLD: 0.04 K/UL (ref 0–0.12)
BILIRUB SERPL-MCNC: 1.4 MG/DL (ref 0.1–1.5)
BUN SERPL-MCNC: 17 MG/DL (ref 8–22)
CALCIUM ALBUM COR SERPL-MCNC: 9.4 MG/DL (ref 8.5–10.5)
CALCIUM SERPL-MCNC: 9.3 MG/DL (ref 8.5–10.5)
CHLORIDE SERPL-SCNC: 101 MMOL/L (ref 96–112)
CO2 SERPL-SCNC: 19 MMOL/L (ref 20–33)
CREAT SERPL-MCNC: 1.04 MG/DL (ref 0.5–1.4)
EOSINOPHIL # BLD AUTO: 0.21 K/UL (ref 0–0.51)
EOSINOPHIL NFR BLD: 2.2 % (ref 0–6.9)
ERYTHROCYTE [DISTWIDTH] IN BLOOD BY AUTOMATED COUNT: 46.5 FL (ref 35.9–50)
GFR SERPLBLD CREATININE-BSD FMLA CKD-EPI: 84 ML/MIN/1.73 M 2
GLOBULIN SER CALC-MCNC: 3 G/DL (ref 1.9–3.5)
GLUCOSE SERPL-MCNC: 126 MG/DL (ref 65–99)
HCT VFR BLD AUTO: 49.2 % (ref 42–52)
HGB BLD-MCNC: 16.1 G/DL (ref 14–18)
IMM GRANULOCYTES # BLD AUTO: 0.07 K/UL (ref 0–0.11)
IMM GRANULOCYTES NFR BLD AUTO: 0.7 % (ref 0–0.9)
LYMPHOCYTES # BLD AUTO: 1.97 K/UL (ref 1–4.8)
LYMPHOCYTES NFR BLD: 21 % (ref 22–41)
MAGNESIUM SERPL-MCNC: 1.7 MG/DL (ref 1.5–2.5)
MCH RBC QN AUTO: 28.3 PG (ref 27–33)
MCHC RBC AUTO-ENTMCNC: 32.7 G/DL (ref 32.3–36.5)
MCV RBC AUTO: 86.5 FL (ref 81.4–97.8)
MONOCYTES # BLD AUTO: 0.89 K/UL (ref 0–0.85)
MONOCYTES NFR BLD AUTO: 9.5 % (ref 0–13.4)
NEUTROPHILS # BLD AUTO: 6.18 K/UL (ref 1.82–7.42)
NEUTROPHILS NFR BLD: 66.2 % (ref 44–72)
NRBC # BLD AUTO: 0 K/UL
NRBC BLD-RTO: 0 /100 WBC (ref 0–0.2)
PLATELET # BLD AUTO: 177 K/UL (ref 164–446)
PMV BLD AUTO: 10.9 FL (ref 9–12.9)
POTASSIUM SERPL-SCNC: 3.8 MMOL/L (ref 3.6–5.5)
PROT SERPL-MCNC: 6.9 G/DL (ref 6–8.2)
RBC # BLD AUTO: 5.69 M/UL (ref 4.7–6.1)
SODIUM SERPL-SCNC: 134 MMOL/L (ref 135–145)
WBC # BLD AUTO: 9.4 K/UL (ref 4.8–10.8)

## 2023-12-11 PROCEDURE — 700102 HCHG RX REV CODE 250 W/ 637 OVERRIDE(OP): Performed by: HEALTH CARE PROVIDER

## 2023-12-11 PROCEDURE — 700111 HCHG RX REV CODE 636 W/ 250 OVERRIDE (IP): Mod: JZ

## 2023-12-11 PROCEDURE — 80053 COMPREHEN METABOLIC PANEL: CPT

## 2023-12-11 PROCEDURE — 700117 HCHG RX CONTRAST REV CODE 255: Performed by: INTERNAL MEDICINE

## 2023-12-11 PROCEDURE — 700102 HCHG RX REV CODE 250 W/ 637 OVERRIDE(OP): Performed by: INTERNAL MEDICINE

## 2023-12-11 PROCEDURE — 700102 HCHG RX REV CODE 250 W/ 637 OVERRIDE(OP): Performed by: NURSE PRACTITIONER

## 2023-12-11 PROCEDURE — 85025 COMPLETE CBC W/AUTO DIFF WBC: CPT

## 2023-12-11 PROCEDURE — 99232 SBSQ HOSP IP/OBS MODERATE 35: CPT | Performed by: INTERNAL MEDICINE

## 2023-12-11 PROCEDURE — A9270 NON-COVERED ITEM OR SERVICE: HCPCS | Performed by: NURSE PRACTITIONER

## 2023-12-11 PROCEDURE — A9270 NON-COVERED ITEM OR SERVICE: HCPCS | Performed by: INTERNAL MEDICINE

## 2023-12-11 PROCEDURE — 36415 COLL VENOUS BLD VENIPUNCTURE: CPT

## 2023-12-11 PROCEDURE — 83735 ASSAY OF MAGNESIUM: CPT

## 2023-12-11 PROCEDURE — A9270 NON-COVERED ITEM OR SERVICE: HCPCS | Performed by: HEALTH CARE PROVIDER

## 2023-12-11 PROCEDURE — 99222 1ST HOSP IP/OBS MODERATE 55: CPT | Mod: GC | Performed by: FAMILY MEDICINE

## 2023-12-11 PROCEDURE — 71275 CT ANGIOGRAPHY CHEST: CPT

## 2023-12-11 RX ORDER — FUROSEMIDE 10 MG/ML
20 INJECTION INTRAMUSCULAR; INTRAVENOUS
Status: DISCONTINUED | OUTPATIENT
Start: 2023-12-11 | End: 2023-12-11 | Stop reason: HOSPADM

## 2023-12-11 RX ORDER — FUROSEMIDE 10 MG/ML
20 INJECTION INTRAMUSCULAR; INTRAVENOUS ONCE
Status: COMPLETED | OUTPATIENT
Start: 2023-12-11 | End: 2023-12-11

## 2023-12-11 RX ADMIN — ACETAMINOPHEN 650 MG: 325 TABLET, FILM COATED ORAL at 03:54

## 2023-12-11 RX ADMIN — CLOPIDOGREL BISULFATE 75 MG: 75 TABLET ORAL at 05:58

## 2023-12-11 RX ADMIN — FUROSEMIDE 20 MG: 10 INJECTION, SOLUTION INTRAVENOUS at 09:14

## 2023-12-11 RX ADMIN — SACUBITRIL AND VALSARTAN 1 TABLET: 24; 26 TABLET, FILM COATED ORAL at 09:14

## 2023-12-11 RX ADMIN — IOHEXOL 60 ML: 350 INJECTION, SOLUTION INTRAVENOUS at 05:00

## 2023-12-11 RX ADMIN — HYDRALAZINE HYDROCHLORIDE 10 MG: 10 TABLET, FILM COATED ORAL at 05:58

## 2023-12-11 RX ADMIN — ASPIRIN 81 MG: 81 TABLET, COATED ORAL at 05:58

## 2023-12-11 ASSESSMENT — ENCOUNTER SYMPTOMS
PALPITATIONS: 0
CHEST TIGHTNESS: 0
APNEA: 0
SHORTNESS OF BREATH: 0

## 2023-12-11 ASSESSMENT — FIBROSIS 4 INDEX: FIB4 SCORE: 2.18

## 2023-12-11 NOTE — ASSESSMENT & PLAN NOTE
Longstanding alcohol use with daily intake 4-5+ drinks. No alcohol in past 3 months per patient.     - Monitor for sequelae of chronic alcohol use

## 2023-12-11 NOTE — DISCHARGE SUMMARY
[facility] Discharge Summary    Attending: Karthik Morales M.d.  Senior Resident: Dr. Bernal   Intern:  Dr. Parikh    CHIEF COMPLAINT ON ADMISSION  Chief Complaint   Patient presents with    Sent by MD     Cardiologist Dr. Alcazar- recommended ED visit (thursday 12/7/23) d/t dizziness, lightheaded since 7/2023.  Hx stents placed 10/2023.    Dizziness     Constant since 7/7/23.       Reason for Admission  Sent by MD     Admission Date  12/10/2023    CODE STATUS  Full Code    HPI & HOSPITAL COURSE  This is a 53 y.o. male  with history of HFrEF (EF 35-40% in 07/2023), CAD, HTN, and CKD Stage 2 who presented with progressive worsening of dyspnea who was admitted for acute heart failure exacerbation.     ACC/AHA stage C congestive heart failure due to ischemic cardiomyopathy (HCC), HFimpEF   Presented with acute worsening chronic dyspnea and fatigue secondary to HFrEF from echo on EF 35 to 40% in 7/2023.  Per chart review HFrEF likely secondary to ischemic cardiomyopathy.  Patient had a 15 pound weight gain in the past week prior to hospitalization and increased dyspnea with walking as little as 20 feet.  Mild improvement with his as needed 20 mg furosemide p.o.  proBNP was 3294 on arrival, increased from 441 in September 2023.  On exam he had 2+ lower extremity pitting edema.  Neurology was consulted from the ER.  He was given furosemide 20 mg IV once in the ED.      Repeat echo demonstrated an EF of 59% on 12/10 therefore patient now has HFimpEF.  He was given IV Lasix 20 mg twice daily per cardiology.  He was also started on Entresto per cardiology.    HTN (hypertension)  Chronic, not at goal. /105 on arrival. Improved with dose of hydralazine 20 mg x 1 in ER. No headache, vision changes, chest pain.  As needed hydralazine.  Patient was transition to Entresto per cardiology.  Metoprolol was held during hospitalization, with goal of restarting once patient was euvolemic.    Alcohol abuse, in  remission  Longstanding alcohol use with daily intake 4-5+ drinks. No alcohol in past 3 months per patient.      Tobacco use  Chronic tobacco use for past 40 years. Decreased use to 3-4 cigarettes daily. Nicotine patch provided during admission per patient preference    Coronary artery disease due to lipid rich plaque  Chronic, recent PCI with overlapping 3.5x8mm and 3.0x38mm BONIFACIO to mid LAD on 10/11/2023. No current chest pain or signs of ACS.  Continue DAPT for 6-12 mo post PCI (at least until 04/2024)    Stage 2 chronic kidney disease  Undetermined chronicity of kidney disease but GFR < 90 for past 3-4 months consistent with CKD Stage 2. Recent GFR in past 2 months lower at 47-54 possibly representing progression but not long enough for re-classification of CKD. Patient had microalbumin/cr ratio of 165  Consider initiation of SGLT2i for dual benefit in HF and CKD    Patient on 12/11/2023 stated that he wanted to leave. He reported this to cardiology in the morning who stated that they did not recommend him leaving as he continued to need diuresis.  Per physical exam this morning patient continued to have lower extremity edema and crackling in his lungs, suggesting that he was still fluid overloaded.  Our recommendation was to continue diuresis as well. Patient was seen at bedside where education was reinforced on the risks of leaving against medical advice.  Patient was aware of risks with leaving AMA and stated that if his shortness of breath and extremity swelling worsened he would return to the ED. He was instructed to follow-up with his appointment at the heart failure clinic.  He was also instructed to follow-up with us in the outpatient clinic as well for further management.    Therefore, patient per nursing left against medical advice around 2:56 PM on 12/11/2023.  His Entresto was sent down to the [facility] to beds prior to him leaving.    Discharge Date  12/11/2023    Physical Exam on Day of  Discharge  Physical Exam  Constitutional:       Appearance: Normal appearance.   HENT:      Mouth/Throat:      Mouth: Mucous membranes are moist.      Pharynx: Oropharynx is clear.   Eyes:      Extraocular Movements: Extraocular movements intact.      Conjunctiva/sclera: Conjunctivae normal.   Cardiovascular:      Rate and Rhythm: Normal rate and regular rhythm.      Pulses: Normal pulses.      Heart sounds: Normal heart sounds.   Pulmonary:      Comments: Crackles in left lower lobe  Abdominal:      General: Bowel sounds are normal.      Palpations: Abdomen is soft.      Tenderness: There is no abdominal tenderness.   Musculoskeletal:      Cervical back: Normal range of motion and neck supple.      Right lower leg: Edema present.      Left lower leg: Edema present.      Comments: 1+ pitting edema present   Skin:     General: Skin is warm.      Capillary Refill: Capillary refill takes less than 2 seconds.   Neurological:      General: No focal deficit present.      Mental Status: He is alert and oriented to person, place, and time. Mental status is at baseline.   Psychiatric:         Mood and Affect: Mood normal.         FOLLOW UP ITEMS POST DISCHARGE  None    DISCHARGE DIAGNOSES  Principal Problem:    ACC/AHA stage C congestive heart failure due to ischemic cardiomyopathy (HCC) (POA: Yes)  Active Problems:    HTN (hypertension) (POA: Yes)      Overview: Patient syncopal episode was likely hypertensive emergency at the end of       June 2023.       Patient has a blood pressure log, it ranges from 108/82-1 69/21.  Left       arm is often 5-40 systolic higher.      Patient has only been taking half of his losartan 25 mg due to dizziness       concerns    Tobacco use (POA: Yes)      Overview: Pt reports he has cut down to 2-3 per day. Started smoking at 13 y/o.    Stage 2 chronic kidney disease (POA: Yes)      Overview: Cr=1.8  July 2023. Unclear if EVELYN or CKD.    Alcohol abuse, in remission (POA: Yes)    Coronary  artery disease due to lipid rich plaque (POA: Yes)  Resolved Problems:    * No resolved hospital problems. *      FOLLOW UP  Future Appointments   Date Time Provider Department Center   1/3/2024  1:15 PM [facility] None     No follow-up provider specified.    MEDICATIONS ON DISCHARGE     Medication List        START taking these medications        Instructions   sacubitril-valsartan 24-26 MG Tabs  Commonly known as: Entresto   Take 1 Tablet by mouth 2 times a day.  Dose: 1 Tablet            CONTINUE taking these medications        Instructions   aspirin 81 MG EC tablet   Take 1 Tablet by mouth every day.  Dose: 81 mg     atorvastatin 40 MG Tabs  Commonly known as: Lipitor   TAKE 1 TABLET BY MOUTH NIGHTLY     clopidogrel 75 MG Tabs  Commonly known as: Plavix   Take 75 mg by mouth every day.  Dose: 75 mg     furosemide 20 MG Tabs  Commonly known as: Lasix   Take 1 Tablet by mouth 1 time a day as needed (fluid retention).  Dose: 20 mg     metoprolol SR 25 MG Tb24  Commonly known as: Toprol XL   Take 0.5 Tablets by mouth every day.  Dose: 12.5 mg     potassium chloride ER 10 MEQ tablet  Commonly known as: Klor-Con   Take 1 Tablet by mouth 1 time a day as needed (only on days taking furosemide).  Dose: 10 mEq            STOP taking these medications      losartan 25 MG Tabs  Commonly known as: Cozaar              Allergies  No Known Allergies    DIET  Orders Placed This Encounter   Procedures    Diet Order Diet: Cardiac     Standing Status:   Standing     Number of Occurrences:   1     Order Specific Question:   Diet:     Answer:   Cardiac [6]       ACTIVITY  As tolerated.  Weight bearing as tolerated    CONSULTATIONS  Cardiology     PROCEDURES  None    LABORATORY  Lab Results   Component Value Date    SODIUM 134 (L) 12/11/2023    POTASSIUM 3.8 12/11/2023    CHLORIDE 101 12/11/2023    CO2 19 (L) 12/11/2023    GLUCOSE 126 (H) 12/11/2023    BUN 17 12/11/2023    CREATININE 1.04 12/11/2023        Lab  Results   Component Value Date    WBC 9.4 12/11/2023    HEMOGLOBIN 16.1 12/11/2023    HEMATOCRIT 49.2 12/11/2023    PLATELETCT 177 12/11/2023

## 2023-12-11 NOTE — PROGRESS NOTES
[facility] – [place] [facility] PROGRESS NOTE     Attending: Karthik Morales M.d.    Resident: Tania Parikh M.D.    PATIENT: Jimenez Gandara; [id number]; 1970    ID: 53 y.o. male  with history of HFrEF (EF 35-40% in 07/2023), CAD, HTN, and CKD Stage 2 who presented with progressive worsening of dyspnea who is admitted for acute heart failure exacerbation.     SUBJECTIVE:   No acute events overnight.  This morning reports that he is feeling better.  He denies any chest pain vision changes headache and shortness of breath.  He states that his swelling has decreased and he urinated a lot overnight.  Patient does want to go home.  We did discuss at length with patient that it would be best for him to stay to continue diuresis at this time.    OBJECTIVE:     Vitals:    12/11/23 0121 12/11/23 0516 12/11/23 0759 12/11/23 1139   BP: (!) 128/94 (!) 139/91 (!) 161/103 137/78   Pulse: 95 91 98 90   Resp: 17 16 16 16   Temp: 36.8 °C (98.2 °F) 36.5 °C (97.7 °F) 36.8 °C (98.2 °F) 36.7 °C (98.1 °F)   TempSrc: Temporal Temporal Temporal Temporal   SpO2: 95% 95% 94% 92%   Weight:  98.5 kg (217 lb 2.5 oz)     Height:           Intake/Output Summary (Last 24 hours) at 12/11/2023 1213  Last data filed at 12/11/2023 0400  Gross per 24 hour   Intake 360 ml   Output 1700 ml   Net -1340 ml       PE:   General: No acute distress, resting comfortably in bed.  HEENT: NC/AT. PERRLA. EOMI. MMM  Cardiovascular: Normal S1/S2, RRR, no m/r/g  Respiratory: Symmetric inspiratory effort. Mild crackles in left lower lobe. Right lung clear to auscultation. Good air movement.   Abdomen: BS+, soft, NT/ND   EXT:  [place], 5/5 strength, 2+ pulses, no rashes, bruising, or bleeding. 1+ pitting edema greater in R LE than LLE   Neuro: Non focal with no numbness, tingling or changes in sensation. AxOx4    LABS:  Recent Labs     12/10/23  1541 12/11/23  0323   WBC 8.5 9.4   RBC 5.55 5.69   HEMOGLOBIN 16.2 16.1   HEMATOCRIT 47.3 49.2   MCV 85.2 86.5   MCH 29.2 28.3   RDW  "45.3 46.5   PLATELETCT 176 177   MPV 11.2 10.9   NEUTSPOLYS 62.90 66.20   LYMPHOCYTES 23.80 21.00*   MONOCYTES 10.20 9.50   EOSINOPHILS 2.00 2.20   BASOPHILS 0.60 0.40     Recent Labs     12/10/23  1541 12/11/23  0323   SODIUM 138 134*   POTASSIUM 4.0 3.8   CHLORIDE 104 101   CO2 21 19*   BUN 22 17   CREATININE 1.50* 1.04   CALCIUM 9.1 9.3   MAGNESIUM  --  1.7   ALBUMIN 4.1 3.9     Estimated GFR/CRCL = Estimated Creatinine Clearance: 91.3 mL/min (by C-G formula based on SCr of 1.04 mg/dL).  Recent Labs     12/10/23  1541 12/11/23  0323   GLUCOSE 185* 126*     Recent Labs     12/10/23  1541 12/11/23  0323   ASTSGOT 41 49*   ALTSGPT 47 49   TBILIRUBIN 0.9 1.4   ALKPHOSPHAT 103* 87   GLOBULIN 2.7 3.0             No results for input(s): \"INR\", \"APTT\", \"FIBRINOGEN\" in the last 72 hours.    Invalid input(s): \"DIMER\"    MICROBIOLOGY:   Results       ** No results found for the last 168 hours. **              IMAGING:   CT-CTA CHEST PULMONARY ARTERY W/ RECONS   Final Result         1.  No large central pulmonary embolus is appreciated, evaluation of the subsegmental branches is essentially nondiagnostic due to motion artifacts. Additional imaging would be required for definitive exclusion of small distal pulmonary emboli.   2.  Moderate layering right pleural effusion   3.  Hazy ground glass bilateral pulmonary opacities suggests component of pulmonary edema.   4.  Slight irregular hepatic contour favoring changes of cirrhosis   5.  Subcentimeter low-density hepatic lesion, could represent small cyst or hemangioma, otherwise indeterminate.   6.  Small pericardial effusion      EC-ECHOCARDIOGRAM COMPLETE W/O CONT   Final Result      DX-CHEST-PORTABLE (1 VIEW)   Final Result      1.  Small right pleural effusion and associated atelectasis.   2.  Cardiomegaly.             MEDS:  Current Facility-Administered Medications   Medication Last Admin    furosemide (Lasix) injection 20 mg      sacubitril-valsartan (Entresto) 24-26 MG " 1 Tablet 1 Tablet at 12/11/23 0914    pneumococcal 20-Miladys Conj Vacc (Prevnar 20) syringe 0.5 mL      influenza vaccine quad (Fluzone/Fluarix) injection 0.5 mL      senna-docusate (Pericolace Or Senokot S) 8.6-50 MG per tablet 2 Tablet      And    polyethylene glycol/lytes (Miralax) Packet 1 Packet      And    magnesium hydroxide (Milk Of Magnesia) suspension 30 mL      And    bisacodyl (Dulcolax) suppository 10 mg      enoxaparin (Lovenox) inj 40 mg      acetaminophen (Tylenol) tablet 650 mg 650 mg at 12/11/23 0354    hydrALAZINE (Apresoline) injection 10 mg      ondansetron (Zofran) syringe/vial injection 4 mg      ondansetron (Zofran ODT) dispertab 4 mg      promethazine (Phenergan) tablet 12.5-25 mg      promethazine (Phenergan) suppository 12.5-25 mg      prochlorperazine (Compazine) injection 5-10 mg      aspirin EC tablet 81 mg 81 mg at 12/11/23 0558    atorvastatin (Lipitor) tablet 40 mg 40 mg at 12/10/23 1842    clopidogrel (Plavix) tablet 75 mg 75 mg at 12/11/23 0558    nicotine (Nicoderm) 14 MG/24HR 14 mg 14 mg at 12/10/23 1922    And    nicotine polacrilex (Nicorette) 2 MG piece 2 mg      LORazepam (Ativan) tablet 0.5 mg         ASSESSMENT/PLAN: Jimenez Gandara is a 53 y.o. male   * ACC/AHA stage C congestive heart failure due to ischemic cardiomyopathy (HCC)- (present on admission)  Assessment & Plan  NYHA Class III. Presents with acute worsening of chronic dyspnea and fatigue secondary to HFrEF with most recent EF 35-40% in 07/2023. Per Cardiology notes, HF likely secondary to ischemic cardiomyopathy. Describes 15 lb weight gain in past week, leading to increased dyspnea with walking as little as 20 feet. Minimal improvement with prescribed PO furosemide 20 mg daily PRN. Pro-BNP 3294 on arrival, increased from 441 in 09/2023. 2+ bilateral LE pitting edema on exam, no JVD. Cardiology consulted from ER, appreciate recs. Given furosemide 20 mg IV x 1 in ER. Echo with EF of 59% on 12/10 now with HFimpEF.    -  Active diuresis with furosemide 20 IV BID to achieve goal diuresis of 2-3L daily  - Entresto per Cardiology  - Cardiology following, appreciate recs  IV diuresis 20 mg BID  Start ARNI, uptitrate as tolerated. Consider adding MRA, SGLT2i as kidney function allows. BB once euvolemic  S/P PCI LAD 10/2023, Continue DAPT, HI statin  Labs: daily BMP  Daily weights; Strict I+O’s  F/u in HF clinic has appointment on 1/3/2024  - Continue atorvastatin 40 mg nightly  - Telemetry monitoring  - Hold metoprolol until no longer fluid overloaded and euvolemic  - Once stable, consider addition of spironolactone per GDMT or SGLT2i for combined benefit in HF and CKD    Coronary artery disease due to lipid rich plaque- (present on admission)  Assessment & Plan  Chronic, recent PCI with overlapping 3.5x8mm and 3.0x38mm BONIFACIO to mid LAD on 10/11/2023. No current chest pain or signs of ACS.    - Continue DAPT for 6-12 mo post PCI (at least until 04/2024)    Alcohol abuse, in remission- (present on admission)  Assessment & Plan  Longstanding alcohol use with daily intake 4-5+ drinks. No alcohol in past 3 months per patient.     - Monitor for sequelae of chronic alcohol use    Stage 2 chronic kidney disease- (present on admission)  Assessment & Plan  Undetermined chronicity of kidney disease but GFR < 90 for past 3-4 months consistent with CKD Stage 2. Recent GFR in past 2 months lower at 47-54 possibly representing progression but not long enough for re-classification of CKD. Patient had microalbumin/cr ratio of 165    - Consider initiation of SGLT2i for dual benefit in HF and CKD    Tobacco use- (present on admission)  Assessment & Plan  Chronic tobacco use for past 40 years. Decreased use to 3-4 cigarettes daily.     - Nicotine patch provided during admission per patient preference    HTN (hypertension)- (present on admission)  Assessment & Plan  Chronic, not at goal. /105 on arrival. Improved with dose of hydralazine 20 mg x 1 in  ER. No headache, vision changes, chest pain.    - Losartan transitioned to Entresto per Cardiology  - Holding metoprolol until fluid overload improves and patient is euvolemic   - PRN hydralazine per Cardiology      Dispo: Inpatient management for continued diuresis and management of heart failure exacerbation.    CODE STATUS: Full     Tania Parikh MD  PGY1  [facility]

## 2023-12-11 NOTE — ASSESSMENT & PLAN NOTE
Chronic, recent PCI with overlapping 3.5x8mm and 3.0x38mm BONIFACIO to mid LAD on 10/11/2023. No current chest pain or signs of ACS.    - Continue DAPT for 6-12 mo post PCI (at least until 04/2024)

## 2023-12-11 NOTE — ED NOTES
Road test with pt on RA, O2 saturation no lower than 90% during and after ambulating, HR no higher than 108, pt's WOB increased with a slight expiratory wheeze, pt endorsed increased SOB with ambulation

## 2023-12-11 NOTE — ED NOTES
Pt resting in bed, VSS on RA, GCS 15, NAD, wife at bedside, pt aware POC to admit to hospital, waiting for inpatient bed

## 2023-12-11 NOTE — PROGRESS NOTES
Patient has decided to leave AMA. Education reinforced and patient aware of all risks leaving AMA. Patient provided with instructions for medication changes and follow up appointments. [facility] phone number provided as patient's medication is pending insurance auth at this time and he agrees to come back for it when it is ready for . MD aware that patient chooses to leave AMA.

## 2023-12-11 NOTE — ASSESSMENT & PLAN NOTE
Chronic, not at goal. /105 on arrival. Improved with dose of hydralazine 20 mg x 1 in ER. No headache, vision changes, chest pain.    - Losartan transitioned to Entresto per Cardiology  - Holding metoprolol until fluid overload improves and patient is euvolemic   - PRN hydralazine per Cardiology

## 2023-12-11 NOTE — ED NOTES
Bedside report received from off going RN Bora, assumed care of patient.  POC discussed with patient. Call light within reach, all needs addressed at this time.       Fall risk interventions in place: Move the patient closer to the nurse's station, Patient's personal possessions are with in their safe reach, Place socks on patient, Place fall risk sign on patient's door, Give patient urinal if applicable, Keep floor surfaces clean and dry, and Accompanied to restroom (all applicable per [place] Fall risk assessment)   Continuous monitoring: Cardiac Leads, Pulse Ox, or Blood Pressure  IVF/IV medications: Not Applicable  G20 Left forearm patent and intact  Oxygen: Room Air  Bedside sitter: Not Applicable   Isolation: Not Applicable

## 2023-12-11 NOTE — ASSESSMENT & PLAN NOTE
Undetermined chronicity of kidney disease but GFR < 90 for past 3-4 months consistent with CKD Stage 2. Recent GFR in past 2 months lower at 47-54 possibly representing progression but not long enough for re-classification of CKD. Patient had microalbumin/cr ratio of 165    - Consider initiation of SGLT2i for dual benefit in HF and CKD

## 2023-12-11 NOTE — H&P
[facility] MEDICINE HISTORY AND PHYSICAL    PATIENT ID:  NAME:  Jimenez Gandara  MRN:               [id number]  YOB: 1970    Date of Admission: 12/10/2023     Attending: Karthik Morales MD    Resident: Bin Rodriguez MD (PGY-3)    Primary Care Physician:  Bin Rodriguez M.D.    CC:  Dyspnea    HPI: Jimenez Gandara is a 53 y.o. male with history of HFrEF (EF 35-40% in 07/2023), CAD, HTN, and CKD Stage 2 who presented with progressive worsening of dyspnea. He describes being short of breath for past 6 months and has been receiving Cardiology care for newly diagnosed HFrEF due to ischemic cardiomyopathy (PCI x 2 placed to mid LAD 10/2023). Describes acute worsening in past 7 days with more dyspnea with shorter walks and approx 15 lb weight gain. He describes increased LE edema and swelling of abdomen. He has been taking furosemide 20 mg daily PRN but is hesitant to use more than 3 days per week. He also describes few episodes of syncope in the past several months, his Cardiologist is aware.  He denies chest pain, fever, chills, vision changes.      ER Course:  Presents with /105 on arrival which improved with hydralazine 20 mg IV x 1. He had SpO2 around 87-88% with ambulation but recovers to 90-91% at rest. No fevers or tachycardia. Pro-BNP elevated to 3294, increased from 441  3 mo ago. Troponin borderline elevated to 21-25. CBC WNL. CMP with elevated glucose of 185, Cr 1.5. He received furosemide 20 mg IV x 1 for active diuresis. Cardiology consulted from ER and ordered ECHO as well as transition from losartan to Entresto. CXR revealed cadiomegaly and small R pleural effusion.    REVIEW OF SYSTEMS:   Ten systems reviewed and were negative except as noted in the HPI.                PAST MEDICAL HISTORY:  Past Medical History:   Diagnosis Date    Congestive heart failure (HCC)     Hx of heart artery stent 10/2023    Hypertension        PAST SURGICAL HISTORY:  Past Surgical History:   Procedure  Laterality Date    OTHER CARDIAC SURGERY         FAMILY HISTORY:  History reviewed. No pertinent family history.    SOCIAL HISTORY:   Pt lives in [place] with partner  Tobacco use: 3-4 cigarettes daily, 40 year history  ETOH use: Chronic use but none in past 3 months  Drug use: None    DIET:   Orders Placed This Encounter   Procedures    Diet Order Diet: Cardiac     Standing Status:   Standing     Number of Occurrences:   1     Order Specific Question:   Diet:     Answer:   Cardiac [6]       ALLERGIES:  No Known Allergies    OUTPATIENT MEDICATIONS:    Current Facility-Administered Medications:     sacubitril-valsartan (Entresto) 24-26 MG 1 Tablet, 1 Tablet, Oral, BID, Jase Alcazar M.D., 1 Tablet at 12/10/23 1846    hydrALAZINE (Apresoline) tablet 10 mg, 10 mg, Oral, Q8HRS, Jase Alcazar M.D., 10 mg at 12/10/23 1729    senna-docusate (Pericolace Or Senokot S) 8.6-50 MG per tablet 2 Tablet, 2 Tablet, Oral, BID **AND** polyethylene glycol/lytes (Miralax) Packet 1 Packet, 1 Packet, Oral, QDAY PRN **AND** magnesium hydroxide (Milk Of Magnesia) suspension 30 mL, 30 mL, Oral, QDAY PRN **AND** bisacodyl (Dulcolax) suppository 10 mg, 10 mg, Rectal, QDAY PRN, Bin Rodriguez M.D.    enoxaparin (Lovenox) inj 40 mg, 40 mg, Subcutaneous, DAILY AT 1800, Bin Rodriguez M.D.    acetaminophen (Tylenol) tablet 650 mg, 650 mg, Oral, Q6HRS PRN, Bin Rodriguez M.D.    hydrALAZINE (Apresoline) injection 10 mg, 10 mg, Intravenous, Q4HRS PRN, Bin Rodriguez M.D.    ondansetron (Zofran) syringe/vial injection 4 mg, 4 mg, Intravenous, Q4HRS PRN, Bin Rodriguez M.D.    ondansetron (Zofran ODT) dispertab 4 mg, 4 mg, Oral, Q4HRS PRN, Bin Rodriguez M.D.    promethazine (Phenergan) tablet 12.5-25 mg, 12.5-25 mg, Oral, Q4HRS PRN, Bin Rodriguez M.D.    promethazine (Phenergan) suppository 12.5-25 mg, 12.5-25 mg, Rectal, Q4HRS PRN, Bin Rodriguez M.D.    prochlorperazine (Compazine) injection 5-10 mg, 5-10 mg,  Intravenous, Q4HRS PRN, Bin Rodriguez M.D.    [START ON 2023] nicotine (Nicoderm) 14 MG/24HR 14 mg, 14 mg, Transdermal, Daily-0600 **AND** Nicotine Replacement Patient Education Materials, , , Once **AND** nicotine polacrilex (Nicorette) 2 MG piece 2 mg, 2 mg, Oral, Q HOUR PRN, Bin Rodriguez M.D.    [START ON 2023] aspirin EC tablet 81 mg, 81 mg, Oral, DAILY, Bin Rodriguez M.D.    atorvastatin (Lipitor) tablet 40 mg, 40 mg, Oral, Q EVENING, Bin Rodriguez M.D., 40 mg at 12/10/23 184    [START ON 2023] clopidogrel (Plavix) tablet 75 mg, 75 mg, Oral, DAILY, Bin Rodriguez M.D.    Current Outpatient Medications:     clopidogrel (PLAVIX) 75 MG Tab, Take 75 mg by mouth every day., Disp: , Rfl:     atorvastatin (LIPITOR) 40 MG Tab, TAKE 1 TABLET BY MOUTH NIGHTLY, Disp: , Rfl:     furosemide (LASIX) 20 MG Tab, Take 1 Tablet by mouth 1 time a day as needed (fluid retention)., Disp: 30 Tablet, Rfl: 3    potassium chloride ER (KLOR-CON) 10 MEQ tablet, Take 1 Tablet by mouth 1 time a day as needed (only on days taking furosemide)., Disp: 30 Tablet, Rfl: 3    metoprolol SR (TOPROL XL) 25 MG TABLET SR 24 HR, Take 0.5 Tablets by mouth every day., Disp: 50 Tablet, Rfl: 3    aspirin 81 MG EC tablet, Take 1 Tablet by mouth every day., Disp: 100 Tablet, Rfl: 3    losartan (COZAAR) 25 MG Tab, Take 1 Tablet by mouth every evening., Disp: 90 Tablet, Rfl: 3    PHYSICAL EXAM:  Vitals:    12/10/23 1743 12/10/23 1803 12/10/23 1824 12/10/23 1844   BP: 131/79 134/82 (!) 114/92 116/86   Pulse: 90 90 90 87   Resp: 18  16 18   Temp:       TempSrc:       SpO2: 93% 92% 93% 92%   Weight:       Height:       , Temp (24hrs), Av.1 °C (97 °F), Min:36.1 °C (97 °F), Max:36.1 °C (97 °F)  , Pulse Oximetry: 92 %    General: Pt resting in NAD, cooperative  Skin:  Pink, warm and dry.  No rashes  HEENT: NC/AT. PERRL. EOMI. MMM. No nasal discharge.  Neck:  Supple without lymphadenopathy or rigidity. No JVD   Lungs:   Symmetrical.  CTAB with no W/R/R.  Good air movement.  Cardiovascular:  S1/S2 RRR without M/R/G. 2+ pitting edema in LE bilaterally  Abdomen:  Abdomen is soft and NT. Mild distention. +BS.  Genitourinary:  Deferred.    Extremities:  Full range of motion. No gross deformities noted.  Neuro:  Muscle tone is normal. Cranial nerves II-XII grossly intact.      LAB TESTS:   Recent Labs     12/10/23  1541   WBC 8.5   RBC 5.55   HEMOGLOBIN 16.2   HEMATOCRIT 47.3   MCV 85.2   MCH 29.2   RDW 45.3   PLATELETCT 176   MPV 11.2   NEUTSPOLYS 62.90   LYMPHOCYTES 23.80   MONOCYTES 10.20   EOSINOPHILS 2.00   BASOPHILS 0.60         Recent Labs     12/10/23  1541   SODIUM 138   POTASSIUM 4.0   CHLORIDE 104   CO2 21   BUN 22   CREATININE 1.50*   CALCIUM 9.1   ALBUMIN 4.1     Estimated Creatinine Clearance: 63.7 mL/min (A) (by C-G formula based on SCr of 1.5 mg/dL (H)).        IMAGING:  EC-ECHOCARDIOGRAM COMPLETE W/O CONT         DX-CHEST-PORTABLE (1 VIEW)   Final Result      1.  Small right pleural effusion and associated atelectasis.   2.  Cardiomegaly.               CONSULTS:   Cardiology    ASSESSMENT/PLAN: 53 y.o. male with history of HFrEF (EF 35-40% in 07/2023), CAD, HTN, and CKD Stage 2 admitted for acute heart failure exacerbation.    * ACC/AHA stage C congestive heart failure due to ischemic cardiomyopathy (HCC)  Assessment & Plan  NYHA Class III. Presents with acute worsening of chronic dyspnea and fatigue secondary to HFrEF with most recent EF 35-40% in 07/2023. Per Cardiology notes, HF likely secondary to ischemic cardiomyopathy. Describes 15 lb weight gain in past week, leading to increased dyspnea with walking as little as 20 feet. Minimal improvement with prescribed PO furosemide 20 mg daily PRN. Pro-BNP 3294 on arrival, increased from 441 in 09/2023. 2+ bilateral LE pitting edema on exam, no JVD. Cardiology consulted from ER, appreciate recs. Given furosemide 20 mg IV x 1 in ER.    - Active diuresis with furosemide 20-40  mg IV PRN to achieve goal diuresis of 2-3L daily  - ECHO performed for interval evaluation, pending read  - Losartan transitioned to Entresto per Cardiology  - Cardiology following, appreciate recs  - Continue atorvastatin 40 mg nightly  - Telemetry monitoring  - Hold metoprolol until no longer fluid overloaded  - Once stable, consider addition of spironolactone per GDMT or SGLT2i for combined benefit in HF and CKD    HTN (hypertension)  Assessment & Plan  Chronic, not at goal. /105 on arrival. Improved with dose of hydralazine 20 mg x 1 in ER. No headache, vision changes, chest pain.    - Losartan transitioned to Entresto per Cardiology  - Holding metoprolol until fluid overload improves  - PRN hydralazine per Cardiology    Stage 2 chronic kidney disease  Assessment & Plan  Undetermined chronicity of kidney disease but GFR < 90 for past 3-4 months consistent with CKD Stage 2. Recent GFR in past 2 months lower at 47-54 possibly representing progression but not long enough for re-classification of CKD.    - Ordered urine microalbumin/cr to further evaluate albuminuria in CKD  - Consider initiation of SGLT2i for dual benefit in HF and CKD    Coronary artery disease due to lipid rich plaque  Assessment & Plan  Chronic, recent PCI with overlapping 3.5x8mm and 3.0x38mm BONIFACIO to mid LAD on 10/11/2023. No current chest pain or signs of ACS.    - Continue DAPT for 6-12 mo post PCI (at least until 04/2024)    Alcohol abuse, in remission  Assessment & Plan  Longstanding alcohol use with daily intake 4-5+ drinks. No alcohol in past 3 months per patient.     - Monitor for sequelae of chronic alcohol use    Tobacco use  Assessment & Plan  Chronic tobacco use for past 40 years. Decreased use to 3-4 cigarettes daily.     - Nicotine patch provided during admission per patient preference    Core Measures:  Fluids: None  Lines: PIV  Abx: None  Diet: Cardiac  PPX: SCDs/TEDs, Lovenox    CODE STATUS: FULL      I anticipate the  patient:  will require at least two midnights for appropriate medical management, necessitating inpatient admission because he requires significant diuresis and possibly cardiology intervention      Bin Rodriguez MD  [facility]  PGY-3

## 2023-12-11 NOTE — PROGRESS NOTES
"Cardiology Follow Up Progress Note    Date of Service  12/11/2023    Attending Physician  Karthik Morales M.D.    Chief Complaint   Chief Complaint   Patient presents with    Sent by MD     Cardiologist Dr. Alcazar- recommended ED visit (thursday 12/7/23) d/t dizziness, lightheaded since 7/2023.  Hx stents placed 10/2023.    Dizziness     Constant since 7/7/23.       HPI  Jimenez Gandara is a 53 y.o. male admitted 12/10/2023 with per Dr. Alcazar, \" PMH HTN (long history), CAD s/p PCI LAD 10/2023, ICM, CVA, tobacco use, hx syncope presents with dyspnea and dizziness. Recommended by outpatient cardiologist to come in for further management. States noted home blood pressure elevated around SBP 150s. Associated with dyspnea and increased edema. Has had to use his prn lasix more often. Dizziness improved. Ongoing tobacco states down to 3 cigarettes. Denies other toxic social habits.\"      Interim Events  12/11/2023: No overnight cardiac events. Tele monitoring personally interpreted by me shows SR. VSS; RA; Daily weight 98.5 kg-standing; - 1.7 L UOP overnight. Labs reviewed. Patient requesting to be discharged. We discussed not recommended based on echo and physical assessment, patient still fluid overloaded. Increase IV diuretics and optimize GDMT per below.     Review of Systems  Review of Systems   Respiratory:  Negative for apnea, chest tightness and shortness of breath.    Cardiovascular:  Negative for chest pain, palpitations and leg swelling.       Vital signs in last 24 hours  Temp:  [36.1 °C (97 °F)-36.8 °C (98.2 °F)] 36.8 °C (98.2 °F)  Pulse:  [77-98] 98  Resp:  [12-20] 16  BP: (114-161)/() 161/103  SpO2:  [90 %-95 %] 94 %    Physical Exam  Physical Exam  Vitals reviewed.   Neck:      Vascular: JVD (12) present.   Cardiovascular:      Rate and Rhythm: Normal rate and regular rhythm.      Pulses: Normal pulses.      Heart sounds: Normal heart sounds.   Pulmonary:      Effort: Pulmonary effort is " normal. No respiratory distress.   Abdominal:      General: There is distension.   Musculoskeletal:      Right lower leg: No edema.      Left lower leg: No edema.   Skin:     General: Skin is warm and dry.   Neurological:      General: No focal deficit present.      Mental Status: He is alert.         Lab Review  Lab Results   Component Value Date/Time    WBC 9.4 12/11/2023 03:23 AM    RBC 5.69 12/11/2023 03:23 AM    HEMOGLOBIN 16.1 12/11/2023 03:23 AM    HEMATOCRIT 49.2 12/11/2023 03:23 AM    MCV 86.5 12/11/2023 03:23 AM    MCH 28.3 12/11/2023 03:23 AM    MCHC 32.7 12/11/2023 03:23 AM    MPV 10.9 12/11/2023 03:23 AM      Lab Results   Component Value Date/Time    SODIUM 134 (L) 12/11/2023 03:23 AM    POTASSIUM 3.8 12/11/2023 03:23 AM    CHLORIDE 101 12/11/2023 03:23 AM    CO2 19 (L) 12/11/2023 03:23 AM    GLUCOSE 126 (H) 12/11/2023 03:23 AM    BUN 17 12/11/2023 03:23 AM    CREATININE 1.04 12/11/2023 03:23 AM    BUNCREATRAT 14.0 10/12/2023 03:48 AM      Lab Results   Component Value Date/Time    ASTSGOT 49 (H) 12/11/2023 03:23 AM    ALTSGPT 49 12/11/2023 03:23 AM     Lab Results   Component Value Date/Time    TROPONINT 21 (H) 12/10/2023 05:37 PM       Recent Labs     12/10/23  1541   NTPROBNP 3294*       Cardiac Imaging and Procedures Review  Per Dr. Gil    Assessment/Plan  HFimpEF, Stage C, Class III, LVEF 59%; ICM; HTN; HLD; CAD; CVA  -echo shows moderate LVH, EF 59% Fluid overload  -UA positive for amphetamines, patient reports cessation over the last year, no reoccurance  -Increase IV diuresis 20 mg BID  -Start ARNI, uptitrate as tolerated. Consider adding MRA, SGLT2i as kidney function allows. BB once euvolemic  -S/P PCI LAD 10/2023, Continue DAPT, HI statin  -Labs: daily BMP  -Daily weights; Strict I+O’s:  -Flu and PNA vaccine ordered per pharmacy immunization protocol  -Meds-to-beds and HF instructions on discharge   -FU in HF clinic per below    Future Appointments   Date Time Provider Department  Center   1/3/2024  1:15 PM [facility]. [facility] None       Thank you for allowing me to participate in the care of this patient.  I will continue to follow this patient    Please contact me with any questions.    Please see Dr. Gil's attestation for further details and MDM.     I personally spent a total of 15 minutes which includes face-to-face time and non-face-to-face time spent on preparing to see the patient, reviewing hospital notes and tests, obtaining history from the patient, performing a medically appropriate exam, counseling and educating the patient, ordering medications/tests/procedures/referrals as clinically indicated, and documenting information in the electronic medical record.    RODNEY Cowan.[email or web address], HF-CERT   [facility]  [phone]

## 2023-12-11 NOTE — ASSESSMENT & PLAN NOTE
Chronic tobacco use for past 40 years. Decreased use to 3-4 cigarettes daily.     - Nicotine patch provided during admission per patient preference

## 2023-12-11 NOTE — PROGRESS NOTES
[facility]INE PROGRESS NOTE     Attending: Karthik Morales MD  Senior Resident: Arely Bernal MD(PGY-2)  Franki Resident: Tania Parikh MD (PGY-1)  Medical Student: Avila Palumbo (MS3)    Primary Care Physician: Bin Rodriguez MD    HPI: Jimenez Gandara is a 53yoM with PMH of HFrEF (EF 35-40% 7/2023), CAD, HTN, and stage 2 CKD who presented with progressively worsening dyspnea. He describes SOB for the last several months along with worsening bilateral lower extremity and abdominal swelling as well as weight gain of roughly 15lbs over the last week. The SOB and swelling have worsened over the last week. Cardiology follows him for newly-diagnosed HFrEF due to ischemic cardiomyopathy (PCI x2 placed mid-LAD 10/2023). He has had worsened SOB on shorter walks over the last week. He takes furosemide 20mg daily prn. He also describes a few episodes of syncope over the last several months which Cardiology is aware of. H    Subjective:   No acute events overnight. This morning Mr. Gandara reports he is feeling much better. He believes his leg swelling is improved. 1.7L urine output overnight. He denies any difficulty breathing, SOB, chest pain, palpitations, dizziness, headaches. He is eager to get back home today. When discussing probable need for further diuresis, he is quite insistent on desire to go home today. Cardiology following.    OBJECTIVE:  Temp:  [36.1 °C (97 °F)-36.8 °C (98.2 °F)] 36.5 °C (97.7 °F)  Pulse:  [77-96] 91  Resp:  [12-20] 16  BP: (114-159)/() 139/91  SpO2:  [90 %-95 %] 95 %    Intake/Output Summary (Last 24 hours) at 12/11/2023 0701  Last data filed at 12/11/2023 0400  Gross per 24 hour   Intake 360 ml   Output 1700 ml   Net -1340 ml       PE:  General: Well appearing male in no acute distress, resting on arrival to room  HEENT: Normocephalic, atraumatic  Cardiovascular: RRR, no murmurs, gallops, or rubs  Pulmonary: Symmetrical chest expansion, equal breath sounds bilaterally,  minor crackles L lower lobe, no rhonchi or wheezes  Abdominal: Mild distention. Soft, non-tender. + bowel sounds, no rigidity or guarding.  Extremities: Moves all spontaneously, 1+ pitting edema in bilateral LE  Neurological: Alert and oriented, non-focal, CN II-XII grossly intact.    LABS:  Recent Labs     12/10/23  1541 12/11/23  0323   WBC 8.5 9.4   RBC 5.55 5.69   HEMOGLOBIN 16.2 16.1   HEMATOCRIT 47.3 49.2   MCV 85.2 86.5   MCH 29.2 28.3   RDW 45.3 46.5   PLATELETCT 176 177   MPV 11.2 10.9   NEUTSPOLYS 62.90 66.20   LYMPHOCYTES 23.80 21.00*   MONOCYTES 10.20 9.50   EOSINOPHILS 2.00 2.20   BASOPHILS 0.60 0.40     Recent Labs     12/10/23  1541 12/11/23  0323   SODIUM 138 134*   POTASSIUM 4.0 3.8   CHLORIDE 104 101   CO2 21 19*   BUN 22 17   CREATININE 1.50* 1.04   CALCIUM 9.1 9.3   MAGNESIUM  --  1.7   ALBUMIN 4.1 3.9     Estimated GFR/CRCL = Estimated Creatinine Clearance: 91.3 mL/min (by C-G formula based on SCr of 1.04 mg/dL).  Recent Labs     12/10/23  1541 12/11/23  0323   GLUCOSE 185* 126*     Recent Labs     12/10/23  1541 12/11/23  0323   ASTSGOT 41 49*   ALTSGPT 47 49   TBILIRUBIN 0.9 1.4   ALKPHOSPHAT 103* 87   GLOBULIN 2.7 3.0           IMAGING:   CT-CTA CHEST PULMONARY ARTERY W/ RECONS   Final Result         1.  No large central pulmonary embolus is appreciated, evaluation of the subsegmental branches is essentially nondiagnostic due to motion artifacts. Additional imaging would be required for definitive exclusion of small distal pulmonary emboli.   2.  Moderate layering right pleural effusion   3.  Hazy ground glass bilateral pulmonary opacities suggests component of pulmonary edema.   4.  Slight irregular hepatic contour favoring changes of cirrhosis   5.  Subcentimeter low-density hepatic lesion, could represent small cyst or hemangioma, otherwise indeterminate.   6.  Small pericardial effusion      EC-ECHOCARDIOGRAM COMPLETE W/O CONT   Final Result      DX-CHEST-PORTABLE (1 VIEW)   Final Result       1.  Small right pleural effusion and associated atelectasis.   2.  Cardiomegaly.           Echocardiogram 12/10/2023:  Demonstrates LVEF 59%, increased RV and RA pressures (15 and 70 mmHg, respectively), RV dilation, and decreased RV systolic function.    MEDS:  Current Facility-Administered Medications   Medication Last Admin    hydrALAZINE (Apresoline) tablet 10 mg 10 mg at 12/11/23 0558    senna-docusate (Pericolace Or Senokot S) 8.6-50 MG per tablet 2 Tablet      And    polyethylene glycol/lytes (Miralax) Packet 1 Packet      And    magnesium hydroxide (Milk Of Magnesia) suspension 30 mL      And    bisacodyl (Dulcolax) suppository 10 mg      enoxaparin (Lovenox) inj 40 mg      acetaminophen (Tylenol) tablet 650 mg 650 mg at 12/11/23 0354    hydrALAZINE (Apresoline) injection 10 mg      ondansetron (Zofran) syringe/vial injection 4 mg      ondansetron (Zofran ODT) dispertab 4 mg      promethazine (Phenergan) tablet 12.5-25 mg      promethazine (Phenergan) suppository 12.5-25 mg      prochlorperazine (Compazine) injection 5-10 mg      aspirin EC tablet 81 mg 81 mg at 12/11/23 0558    atorvastatin (Lipitor) tablet 40 mg 40 mg at 12/10/23 1842    clopidogrel (Plavix) tablet 75 mg 75 mg at 12/11/23 0558    nicotine (Nicoderm) 14 MG/24HR 14 mg 14 mg at 12/10/23 1922    And    nicotine polacrilex (Nicorette) 2 MG piece 2 mg      LORazepam (Ativan) tablet 0.5 mg         ASSESSMENT/PLAN: Jimenez Gandara is a 53yoM with PMH of HFrEF (EF 35-40% on 7/2023, now 59% as of 12/10/2023), CAD, HTN, and stage 2 CKD admitted on 12/10 for acute heart failure exacerbation.    * ACC/AHA stage C congestive heart failure due to ischemic cardiomyopathy (HCC)  Assessment & Plan  NYHA Class III. Presents with acute worsening of chronic dyspnea and fatigue secondary to HFrEF with most recent EF 35-40% in 07/2023. Per Cardiology notes, HF likely secondary to ischemic cardiomyopathy. Patient notes 15lb weight gain in past week, leading to  increased dyspnea with walking as little as 20 feet. Minimal improvement with prescribed PO furosemide 20 mg daily PRN. Pro-BNP 3294 on arrival, increased from 441 in 09/2023. 2+ bilateral LE pitting edema on exam, no JVD. Cardiology consulted from ER, appreciate recs. Given furosemide 20 mg IV x 1 in ER.     Active diuresis with furosemide 20mg IV BID, goal diuresis of 2-3L daily.  ECHO demonstrates improved EF of 59% compared to 35-40% in 7/2023, now HFimpEF  Losartan transitioned to Entresto per Cardiology  Cardiology following, appreciate recs. Currently recommending against discharge given echo and fluid overload on physical exam.  Continue atorvastatin 40 mg nightly  Telemetry monitoring  Hold metoprolol until euvolemic  Once stable, consider addition of spironolactone per GDMT or SGLT2i for combined benefit in HF and CKD     Hypertension  Assessment & Plan  Chronic, not at goal. /105 on arrival. Improved with dose of hydralazine 20 mg x 1 in ER. No headache, vision changes, chest pain. Overnight 120s-130s/80s-low 100s.  Losartan transitioned to Entresto per Cardiology  Holding metoprolol until euvolemic  PRN hydralazine per Cardiology     Stage 2 chronic kidney disease  Assessment & Plan  Undetermined chronicity of kidney disease but GFR < 90 for past 3-4 months consistent with CKD Stage 2. Recent GFR in past 2 months lower at 47-54 possibly representing progression but not long enough for re-classification of CKD.  Urine microalbumin/Cr elevated at 165. Possible hypertensive nephropathy exacerbation on top of CKD  Consider starting SGLT2 inhibitor for dual benefit in HF and CKD    Coronary artery disease due to lipid rich plaque  Assessment & Plan  Chronic, recent PCI with overlapping 3.5x8mm and 3.0x38mm BONIFACIO to mid LAD on 10/11/2023. No current chest pain or signs of ACS.  Continue DAPT for 6-12 mo post PCI (at least until 04/2024)  Continue Atorvastatin  Continue to monitor for any ACS symptoms      Amphetamine use  Assessment & Plan  Patient reports abstinence from methamphetamine for about the last year. UDS + for amphetamines overnight. Patient denies use of methamphetamine and other amphetamine prescriptions (e.g. Adderall), reiterates abstaining x 1 year.  Current HF exacerbation possibly related to recent amphetamine use despite patient's denied usage  Continue to monitor for withdrawal symptoms  Outpatient addiction resources to be provided upon discharge per patient preferences    Alcohol abuse, in remission  Assessment & Plan  Longstanding alcohol use with daily intake 4-5+ drinks. No alcohol in past 3 months per patient.   CTA chest showed evidence of cirrhosis, small low density hepatic lesion. Patient currently asymptomatic, mild AST elevation at 49 otherwise liver panel unremarkable.  Continue to monitor for any results of chronic alcohol use (ascites, LFT changes, palmar erythema)    Tobacco use  Assessment & Plan  Chronic tobacco use for past 40 years. Decreased use to 3-4 cigarettes daily.   Nicotine patch provided during admission per patient preference       Core Measures:  Fluids: None  Lines: PIV  Abx: None  Diet: Cardiac  PPX: SCDs/TEDs, Lovenox    Core Measures:  Fluids: none  Lines: PIV  Abx: none  Diet: Cardiac  PPX: SCDs/TEDs, Lovenox    CODE status: FULL    Disposition  Inpatient management for continued diuresis    Avila Palumbo, MS3  [facility] [employer]

## 2023-12-11 NOTE — ED NOTES
Med Rec complete per Pt  Allergies Reviewed    Pt reports taking anticoagulant in the last 14 days  Anticoagulant: PLAVIX, Last dose: 12/10

## 2023-12-11 NOTE — PROGRESS NOTES
Pt arrived via gurney from ED and ambulated to the bed without assistance. Pt placed on the telemetry monitor, SR. VILLAR. Discussed POC and oriented patient to room and unit expectations. All needs met at this time.

## 2023-12-11 NOTE — CONSULTS
Reason for Consult:  Asked by Dr Rosas Brown M.D. to see this patient with shortness of breath     CC:   Chief Complaint   Patient presents with    Sent by MD     Cardiologist Dr. Alcazar- recommended ED visit (thursday 12/7/23) d/t dizziness, lightheaded since 7/2023.  Hx stents placed 10/2023.    Dizziness     Constant since 7/7/23.       HPI:      53 year old man PMH HTN (long history), CAD s/p PCI LAD 10/2023, ICM, CVA, tobacco use, hx syncope presents with dyspnea and dizziness. Recommended by outpatient cardiologist to come in for further management. States noted home blood pressure elevated around SBP 150s. Associated with dyspnea and increased edema. Has had to use his prn lasix more often. Dizziness improved. Ongoing tobacco states down to 3 cigarettes. Denies other toxic social habits.     Medications / Drug list prior to admission:  No current facility-administered medications on file prior to encounter.     Current Outpatient Medications on File Prior to Encounter   Medication Sig Dispense Refill    clopidogrel (PLAVIX) 75 MG Tab Take 75 mg by mouth every day.      atorvastatin (LIPITOR) 40 MG Tab TAKE 1 TABLET BY MOUTH NIGHTLY      furosemide (LASIX) 20 MG Tab Take 1 Tablet by mouth 1 time a day as needed (fluid retention). 30 Tablet 3    potassium chloride ER (KLOR-CON) 10 MEQ tablet Take 1 Tablet by mouth 1 time a day as needed (only on days taking furosemide). 30 Tablet 3    metoprolol SR (TOPROL XL) 25 MG TABLET SR 24 HR Take 0.5 Tablets by mouth every day. 50 Tablet 3    aspirin 81 MG EC tablet Take 1 Tablet by mouth every day. 100 Tablet 3    losartan (COZAAR) 25 MG Tab Take 1 Tablet by mouth every evening. 90 Tablet 3       Current list of administered Medications:    Current Facility-Administered Medications:     sacubitril-valsartan (Entresto) 24-26 MG 1 Tablet, 1 Tablet, Oral, BID, Jase Alcazar M.D.    hydrALAZINE (Apresoline) injection 20 mg, 20 mg, Intravenous, Once, Jase  ELLEN Alcazar    hydrALAZINE (Apresoline) tablet 10 mg, 10 mg, Oral, Q8HRS, Jase Alcazar M.D.    furosemide (Lasix) injection 20 mg, 20 mg, Intravenous, Once, Jase Alcazar M.D.    Current Outpatient Medications:     clopidogrel (PLAVIX) 75 MG Tab, Take 75 mg by mouth every day., Disp: , Rfl:     atorvastatin (LIPITOR) 40 MG Tab, TAKE 1 TABLET BY MOUTH NIGHTLY, Disp: , Rfl:     furosemide (LASIX) 20 MG Tab, Take 1 Tablet by mouth 1 time a day as needed (fluid retention)., Disp: 30 Tablet, Rfl: 3    potassium chloride ER (KLOR-CON) 10 MEQ tablet, Take 1 Tablet by mouth 1 time a day as needed (only on days taking furosemide)., Disp: 30 Tablet, Rfl: 3    metoprolol SR (TOPROL XL) 25 MG TABLET SR 24 HR, Take 0.5 Tablets by mouth every day., Disp: 50 Tablet, Rfl: 3    aspirin 81 MG EC tablet, Take 1 Tablet by mouth every day., Disp: 100 Tablet, Rfl: 3    losartan (COZAAR) 25 MG Tab, Take 1 Tablet by mouth every evening., Disp: 90 Tablet, Rfl: 3    Past Medical History:   Diagnosis Date    Congestive heart failure (HCC)     Hx of heart artery stent 10/2023    Hypertension        Past Surgical History:   Procedure Laterality Date    OTHER CARDIAC SURGERY         History reviewed. No pertinent family history.  Patient family history was personally reviewed, no pertinent family history to current presentation    Social History     Socioeconomic History    Marital status: Single     Spouse name: Not on file    Number of children: Not on file    Years of education: Not on file    Highest education level: Not on file   Occupational History    Not on file   Tobacco Use    Smoking status: Every Day     Current packs/day: 0.25     Average packs/day: 0.3 packs/day for 15.0 years (3.8 ttl pk-yrs)     Types: Cigarettes    Smokeless tobacco: Never    Tobacco comments:     3 to 4 cig a day   Vaping Use    Vaping Use: Never used   Substance and Sexual Activity    Alcohol use: Not Currently     Comment: 4 or 5 shots of liquor a  "day    Drug use: Never    Sexual activity: Not on file   Other Topics Concern    Not on file   Social History Narrative    Not on file     Social Determinants of Health     Financial Resource Strain: Not on file   Food Insecurity: Not on file   Transportation Needs: Not on file   Physical Activity: Not on file   Stress: Not on file   Social Connections: Not on file   Intimate Partner Violence: Not on file   Housing Stability: Not on file       ALLERGIES:  No Known Allergies    Review of systems:  A complete review of symptoms was reviewed with patient. This is reviewed in H&P and PMH. ALL OTHERS reviewed and negative    Physical exam:  Patient Vitals for the past 24 hrs:   BP Temp Temp src Pulse Resp SpO2 Height Weight   12/10/23 1603 (!) 141/97 -- -- 90 15 91 % -- --   12/10/23 1553 (!) 159/107 -- -- 87 -- -- -- --   12/10/23 1550 (!) 152/108 -- -- 90 -- -- -- --   12/10/23 1548 (!) 148/108 -- -- 87 -- -- -- --   12/10/23 1430 -- -- -- -- -- -- 1.727 m (5' 8\") 99.7 kg (219 lb 12.8 oz)   12/10/23 1405 (!) 150/105 36.1 °C (97 °F) Temporal 96 20 92 % -- --     General: No acute distress.   EYES: no jaundice  HEENT: OP clear   Neck: No bruits No JVD.   CVS:  RRR. S1 + S2. No M/R/G. No edema.  Resp: CTAB. No wheezing or crackles/rhonchi.  Abdomen: Soft, NT, ND,  Skin: Grossly nothing acute no obvious rashes  Neurological: Alert, Moves all extremities, no cranial nerve defects on limited exam  Extremities: Pulse 2+ in b/l LE. No cyanosis.     Data:  Laboratory studies personally reviewed by me:  Recent Results (from the past 24 hour(s))   EKG    Collection Time: 12/10/23  2:14 PM   Result Value Ref Range    Report       [facility]    Test Date:  2023-12-10  Pt Name:    BLAS RICHARDSON                  Department: ER  MRN:        [id number]                      Room:  Gender:     Male                         Technician: [id number]  :        1970                   Requested By:ER TRIAGE " PROTOCOL  Order #:    [id number]                    Reading MD:    Measurements  Intervals                                Axis  Rate:       92                           P:          59  AR:         195                          QRS:        125  QRSD:       86                           T:          103  QT:         408  QTc:        505    Interpretive Statements  Sinus rhythm  Left posterior fascicular block  Nonspecific T abnormalities, lateral leads  Prolonged QT interval  Compared to ECG 11/20/2023 14:31:28  Left posterior fascicular block now present  Prolonged QT interval now present  T-wave abnormality still present     CBC with Differential    Collection Time: 12/10/23  3:41 PM   Result Value Ref Range    WBC 8.5 4.8 - 10.8 K/uL    RBC 5.55 4.70 - 6.10 M/uL    Hemoglobin 16.2 14.0 - 18.0 g/dL    Hematocrit 47.3 42.0 - 52.0 %    MCV 85.2 81.4 - 97.8 fL    MCH 29.2 27.0 - 33.0 pg    MCHC 34.2 32.3 - 36.5 g/dL    RDW 45.3 35.9 - 50.0 fL    Platelet Count 176 164 - 446 K/uL    MPV 11.2 9.0 - 12.9 fL    Neutrophils-Polys 62.90 44.00 - 72.00 %    Lymphocytes 23.80 22.00 - 41.00 %    Monocytes 10.20 0.00 - 13.40 %    Eosinophils 2.00 0.00 - 6.90 %    Basophils 0.60 0.00 - 1.80 %    Immature Granulocytes 0.50 0.00 - 0.90 %    Nucleated RBC 0.00 0.00 - 0.20 /100 WBC    Neutrophils (Absolute) 5.33 1.82 - 7.42 K/uL    Lymphs (Absolute) 2.01 1.00 - 4.80 K/uL    Monos (Absolute) 0.86 (H) 0.00 - 0.85 K/uL    Eos (Absolute) 0.17 0.00 - 0.51 K/uL    Baso (Absolute) 0.05 0.00 - 0.12 K/uL    Immature Granulocytes (abs) 0.04 0.00 - 0.11 K/uL    NRBC (Absolute) 0.00 K/uL   Complete Metabolic Panel (CMP)    Collection Time: 12/10/23  3:41 PM   Result Value Ref Range    Sodium 138 135 - 145 mmol/L    Potassium 4.0 3.6 - 5.5 mmol/L    Chloride 104 96 - 112 mmol/L    Co2 21 20 - 33 mmol/L    Anion Gap 13.0 7.0 - 16.0    Glucose 185 (H) 65 - 99 mg/dL    Bun 22 8 - 22 mg/dL    Creatinine 1.50 (H) 0.50 - 1.40 mg/dL    Calcium 9.1 8.5 - 10.5  mg/dL    Correct Calcium 9.0 8.5 - 10.5 mg/dL    AST(SGOT) 41 12 - 45 U/L    ALT(SGPT) 47 2 - 50 U/L    Alkaline Phosphatase 103 (H) 30 - 99 U/L    Total Bilirubin 0.9 0.1 - 1.5 mg/dL    Albumin 4.1 3.2 - 4.9 g/dL    Total Protein 6.8 6.0 - 8.2 g/dL    Globulin 2.7 1.9 - 3.5 g/dL    A-G Ratio 1.5 g/dL   Troponins NOW    Collection Time: 12/10/23  3:41 PM   Result Value Ref Range    Troponin T 25 (H) 6 - 19 ng/L   proBrain Natriuretic Peptide, NT    Collection Time: 12/10/23  3:41 PM   Result Value Ref Range    NT-proBNP 3294 (H) 0 - 125 pg/mL   ESTIMATED GFR    Collection Time: 12/10/23  3:41 PM   Result Value Ref Range    GFR (CKD-EPI) 54 (A) >60 mL/min/1.73 m 2       EKG 12/10/23 interpreted by me sinus, LPFB    All pertinent features of laboratory and imaging reviewed including primary images where applicable      Active Problems:    * No active hospital problems. *  Resolved Problems:    * No resolved hospital problems. *      Assessment / Plan:  53 year old man PMH HTN (long history), CAD s/p PCI LAD 10/2023, ICM, CVA, tobacco use, hx syncope presents with dyspnea and dizziness. Recommended by outpatient cardiologist to come in for further management.     -UDS  -TTE  -IV hydralazine, PO maintenance. Can add isordil after. Titrate both as tolerated.  -change losartan to entresto.  -when euvolemic and stable resume BB  -titrate HFOMT as vitals and renal markers allow    I personally discussed his case with Dr Brown    It is my pleasure to participate in the care of Mr. Gandara.  Please do not hesitate to contact me with questions or concerns.    Jase Alcazar MD  Cardiologist [facility]    A total of 67 minutes of time was spent on day of encounter reviewing medical record, performing history and examination, counseling, ordering medication/test/consults and documentation.

## 2023-12-11 NOTE — PROGRESS NOTES
4 Eyes Skin Assessment Completed by Mariusz RN and Lillian RN.    Head WDL  Ears WDL  Nose WDL  Mouth WDL  Neck WDL  Breast/Chest WDL  Shoulder Blades WDL  Spine WDL  (R) Arm/Elbow/Hand WDL  (L) Arm/Elbow/Hand WDL  Abdomen WDL  Groin WDL  Scrotum/Coccyx/Buttocks WDL  (R) Leg Scab  (L) Leg Scab and Swelling  (R) Heel/Foot/Toe WDL  (L) Heel/Foot/Toe WDL          Devices In Places Tele Box and Blood Pressure Cuff      Interventions In Place Pillows    Possible Skin Injury No    Pictures Uploaded Into Epic N/A  Wound Consult Placed N/A  RN Wound Prevention Protocol Ordered No

## 2023-12-11 NOTE — ASSESSMENT & PLAN NOTE
NYHA Class III. Presents with acute worsening of chronic dyspnea and fatigue secondary to HFrEF with most recent EF 35-40% in 07/2023. Per Cardiology notes, HF likely secondary to ischemic cardiomyopathy. Describes 15 lb weight gain in past week, leading to increased dyspnea with walking as little as 20 feet. Minimal improvement with prescribed PO furosemide 20 mg daily PRN. Pro-BNP 3294 on arrival, increased from 441 in 09/2023. 2+ bilateral LE pitting edema on exam, no JVD. Cardiology consulted from ER, appreciate recs. Given furosemide 20 mg IV x 1 in ER. Echo with EF of 59% on 12/10 now with HFimpEF.    - Active diuresis with furosemide 20 IV BID to achieve goal diuresis of 2-3L daily  - Entresto per Cardiology  - Cardiology following, appreciate recs  IV diuresis 20 mg BID  Start ARNI, uptitrate as tolerated. Consider adding MRA, SGLT2i as kidney function allows. BB once euvolemic  S/P PCI LAD 10/2023, Continue DAPT, HI statin  Labs: daily BMP  Daily weights; Strict I+O’s  F/u in HF clinic has appointment on 1/3/2024  - Continue atorvastatin 40 mg nightly  - Telemetry monitoring  - Hold metoprolol until no longer fluid overloaded and euvolemic  - Once stable, consider addition of spironolactone per GDMT or SGLT2i for combined benefit in HF and CKD

## 2023-12-12 ENCOUNTER — PHARMACY VISIT (OUTPATIENT)
Dept: PHARMACY | Facility: MEDICAL CENTER | Age: 53
End: 2023-12-12
Payer: COMMERCIAL

## 2023-12-12 PROCEDURE — RXMED WILLOW AMBULATORY MEDICATION CHARGE

## 2023-12-18 ENCOUNTER — OFFICE VISIT (OUTPATIENT)
Dept: MEDICAL GROUP | Facility: CLINIC | Age: 53
End: 2023-12-18
Payer: MEDICAID

## 2023-12-18 VITALS
TEMPERATURE: 97.3 F | RESPIRATION RATE: 14 BRPM | WEIGHT: 209 LBS | DIASTOLIC BLOOD PRESSURE: 87 MMHG | OXYGEN SATURATION: 94 % | HEART RATE: 93 BPM | HEIGHT: 68 IN | BODY MASS INDEX: 31.67 KG/M2 | SYSTOLIC BLOOD PRESSURE: 125 MMHG

## 2023-12-18 DIAGNOSIS — I10 HYPERTENSION, UNSPECIFIED TYPE: ICD-10-CM

## 2023-12-18 DIAGNOSIS — I50.32 CHRONIC DIASTOLIC HEART FAILURE (HCC): Primary | ICD-10-CM

## 2023-12-18 DIAGNOSIS — R42 DIZZINESS: ICD-10-CM

## 2023-12-18 DIAGNOSIS — R55 SYNCOPE, UNSPECIFIED SYNCOPE TYPE: ICD-10-CM

## 2023-12-18 PROCEDURE — 3074F SYST BP LT 130 MM HG: CPT | Performed by: FAMILY MEDICINE

## 2023-12-18 PROCEDURE — 3079F DIAST BP 80-89 MM HG: CPT | Performed by: FAMILY MEDICINE

## 2023-12-18 PROCEDURE — 99214 OFFICE O/P EST MOD 30 MIN: CPT | Performed by: FAMILY MEDICINE

## 2023-12-18 ASSESSMENT — FIBROSIS 4 INDEX: FIB4 SCORE: 2.18

## 2023-12-18 ASSESSMENT — ENCOUNTER SYMPTOMS
PALPITATIONS: 0
DIZZINESS: 1
LOSS OF CONSCIOUSNESS: 0
ORTHOPNEA: 0

## 2023-12-18 NOTE — PROGRESS NOTES
Subjective:     Chief Complaint   Patient presents with    Follow-Up     ER follow up       HPI: Jimenez is a 53 y.o. male who presents today for the following problems:    Here today for follow-up after hospitalization.  He was hospitalized for volume overload and treatment included aggressive diuresis.  He did leave the hospital AGAINST MEDICAL ADVICE before complete diuresis occurred.  He has been doing well since leaving the hospital.  Denies any fainting episodes.  Denies any chest pain.  He does have slight shortness of breath, and a little cough.  He does endorse some mild leg swelling.  He is wearing his compression stockings.  He took Lasix yesterday and the day before.  He is taking it on an as-needed basis.  Is taking lasix as needed. Took one pill yesterday. And one pill the day before.  Negative for CP, shortness of breath, denies orthopnea.  Average 3-4 8 ounce glasses water daily.  Milk intake per day about 2-3 8 ounce glasses daily.  Not a lot of soups or juice.  Salt intake is reported low.  Cigarettes, 3-4/day.  Alcohol, one day last week.      [facility] Discharge Summary 12/12/2023     Attending: Karthik Morales M.d.  Senior Resident: Dr. Bernal   Intern:  Dr. Parikh     CHIEF COMPLAINT ON ADMISSION       Chief Complaint   Patient presents with    Sent by MD       Cardiologist Dr. Alcazar- recommended ED visit (thursday 12/7/23) d/t dizziness, lightheaded since 7/2023.  Hx stents placed 10/2023.    Dizziness       Constant since 7/7/23.         Reason for Admission  Sent by MD      Admission Date  12/10/2023     CODE STATUS  Full Code     HPI & HOSPITAL COURSE  This is a 53 y.o. male  with history of HFrEF (EF 35-40% in 07/2023), CAD, HTN, and CKD Stage 2 who presented with progressive worsening of dyspnea who was admitted for acute heart failure exacerbation.      ACC/AHA stage C congestive heart failure due to ischemic cardiomyopathy (HCC), HFimpEF   Presented with acute worsening  chronic dyspnea and fatigue secondary to HFrEF from echo on EF 35 to 40% in 7/2023.  Per chart review HFrEF likely secondary to ischemic cardiomyopathy.  Patient had a 15 pound weight gain in the past week prior to hospitalization and increased dyspnea with walking as little as 20 feet.  Mild improvement with his as needed 20 mg furosemide p.o.  proBNP was 3294 on arrival, increased from 441 in September 2023.  On exam he had 2+ lower extremity pitting edema.  Neurology was consulted from the ER.  He was given furosemide 20 mg IV once in the ED.       Repeat echo demonstrated an EF of 59% on 12/10 therefore patient now has HFimpEF.  He was given IV Lasix 20 mg twice daily per cardiology.  He was also started on Entresto per cardiology.     HTN (hypertension)  Chronic, not at goal. /105 on arrival. Improved with dose of hydralazine 20 mg x 1 in ER. No headache, vision changes, chest pain.  As needed hydralazine.  Patient was transition to Entresto per cardiology.  Metoprolol was held during hospitalization, with goal of restarting once patient was euvolemic.     Alcohol abuse, in remission  Longstanding alcohol use with daily intake 4-5+ drinks. No alcohol in past 3 months per patient.       Tobacco use  Chronic tobacco use for past 40 years. Decreased use to 3-4 cigarettes daily. Nicotine patch provided during admission per patient preference     Coronary artery disease due to lipid rich plaque  Chronic, recent PCI with overlapping 3.5x8mm and 3.0x38mm BONIFACIO to mid LAD on 10/11/2023. No current chest pain or signs of ACS.  Continue DAPT for 6-12 mo post PCI (at least until 04/2024)     Stage 2 chronic kidney disease  Undetermined chronicity of kidney disease but GFR < 90 for past 3-4 months consistent with CKD Stage 2. Recent GFR in past 2 months lower at 47-54 possibly representing progression but not long enough for re-classification of CKD. Patient had microalbumin/cr ratio of 165  Consider initiation of  SGLT2i for dual benefit in HF and CKD     Patient on 12/11/2023 stated that he wanted to leave. He reported this to cardiology in the morning who stated that they did not recommend him leaving as he continued to need diuresis.  Per physical exam this morning patient continued to have lower extremity edema and crackling in his lungs, suggesting that he was still fluid overloaded.  Our recommendation was to continue diuresis as well. Patient was seen at bedside where education was reinforced on the risks of leaving against medical advice.  Patient was aware of risks with leaving AMA and stated that if his shortness of breath and extremity swelling worsened he would return to the ED. He was instructed to follow-up with his appointment at the heart failure clinic.  He was also instructed to follow-up with us in the outpatient clinic as well for further management.     Therefore, patient per nursing left against medical advice around 2:56 PM on 12/11/2023.  His Entresto was sent down to the [facility] pharmacy meds to beds prior to him leaving.      No problems updated.    Current Outpatient Medications   Medication Sig Dispense Refill    sacubitril-valsartan (ENTRESTO) 24-26 MG Tab Take 1 Tablet by mouth 2 times a day. 60 Tablet 1    clopidogrel (PLAVIX) 75 MG Tab Take 75 mg by mouth every day.      atorvastatin (LIPITOR) 40 MG Tab TAKE 1 TABLET BY MOUTH NIGHTLY      furosemide (LASIX) 20 MG Tab Take 1 Tablet by mouth 1 time a day as needed (fluid retention). 30 Tablet 3    potassium chloride ER (KLOR-CON) 10 MEQ tablet Take 1 Tablet by mouth 1 time a day as needed (only on days taking furosemide). 30 Tablet 3    metoprolol SR (TOPROL XL) 25 MG TABLET SR 24 HR Take 0.5 Tablets by mouth every day. 50 Tablet 3    aspirin 81 MG EC tablet Take 1 Tablet by mouth every day. 100 Tablet 3     No current facility-administered medications for this visit.       Social History     Tobacco Use    Smoking status: Every Day     Current  "packs/day: 0.25     Average packs/day: 0.3 packs/day for 15.0 years (3.8 ttl pk-yrs)     Types: Cigarettes    Smokeless tobacco: Never    Tobacco comments:     3 to 4 cig a day   Vaping Use    Vaping Use: Never used   Substance Use Topics    Alcohol use: Not Currently     Comment: 4 or 5 shots of liquor a day    Drug use: Never        Review of Systems   Cardiovascular:  Negative for chest pain, palpitations and orthopnea.   Neurological:  Positive for dizziness. Negative for loss of consciousness.       Objective:     Vitals:    12/18/23 1017   BP: 125/87   BP Location: Left arm   Patient Position: Sitting   BP Cuff Size: Small adult   Pulse: 93   Resp: 14   Temp: 36.3 °C (97.3 °F)   TempSrc: Temporal   SpO2: 94%   Weight: 94.8 kg (209 lb)   Height: 1.727 m (5' 8\")     Body mass index is 31.78 kg/m².     Physical Exam:  Gen: Well developed, well nourished in no acute distress.   Skin: Pink, warm, and dry  HEENT: conjunctiva non-injected  Cardiovascular: Regular rate and rhythm, no murmurs gallops or rubs  Lungs: Clear to auscultation bilaterally, good air movement throughout all lung fields, no wheezes rales or rhonchi.  Ext: 1+ edema  Alert and oriented Eye contact is good, speech goal directed, affect calm  Gait: not antalgic    Assessment & Plan:   No problem-specific Assessment & Plan notes found for this encounter.  1. Chronic diastolic heart failure (HCC)  Doing well today, physical exam is relatively benign, pulmonary exam benign, looks like he is still slightly fluid overloaded given his reported symptoms, however there are no overt physical exam findings that indicate that he is overloaded apart from very mild lower extremity edema. will do 2 days of increased Lasix at 40 mg a day for 2 days and follow-up on Wednesday.  Discussed importance of ER precautions and red flag symptoms.  He understands these and will go to the emergency room should he experience any signs of heart attack, worsening dyspnea, " orthopnea.  Should we need to, we will follow-up again on Friday.  Also discussed the importance of water intake management, salt restriction.  - 40mg daily lasix for two days  - follow-up on Wednesday    2. Hypertension, unspecified type  Hypertension appears to be well-controlled, no change in therapy needed.    3. Syncope, unspecified syncope type  No episodes of syncope since hospital discharge, he does have dizziness as discussed below.  Will continue to monitor.    4. Dizziness  Dizziness is possibly secondary to volume overload status.  Will treat for volume overload as above, we will follow-up on Wednesday.  Will consider meclizine in the future if this persists.    Followup: Return in about 2 days (around 12/20/2023), or if symptoms worsen or fail to improve.    Patient verbalizes understanding and agreement with assessment and plan.    Theodore Calvo M.D.    Please note that this dictation was created using voice recognition software. I have made every reasonable attempt to correct obvious errors, but I expect that there are errors of grammar and possibly content that I did not discover before finalizing the note.

## 2023-12-20 ENCOUNTER — APPOINTMENT (OUTPATIENT)
Dept: RADIOLOGY | Facility: CLINIC | Age: 53
End: 2023-12-20
Attending: FAMILY MEDICINE
Payer: MEDICAID

## 2023-12-20 ENCOUNTER — OFFICE VISIT (OUTPATIENT)
Dept: MEDICAL GROUP | Facility: CLINIC | Age: 53
End: 2023-12-20
Payer: MEDICAID

## 2023-12-20 VITALS
SYSTOLIC BLOOD PRESSURE: 141 MMHG | BODY MASS INDEX: 31.52 KG/M2 | RESPIRATION RATE: 14 BRPM | TEMPERATURE: 96.5 F | OXYGEN SATURATION: 95 % | WEIGHT: 208 LBS | HEIGHT: 68 IN | HEART RATE: 85 BPM | DIASTOLIC BLOOD PRESSURE: 103 MMHG

## 2023-12-20 DIAGNOSIS — D48.9 NEOPLASM OF UNCERTAIN BEHAVIOR: ICD-10-CM

## 2023-12-20 DIAGNOSIS — I50.32 CHRONIC DIASTOLIC HEART FAILURE (HCC): Primary | ICD-10-CM

## 2023-12-20 PROCEDURE — 99214 OFFICE O/P EST MOD 30 MIN: CPT | Performed by: FAMILY MEDICINE

## 2023-12-20 PROCEDURE — 3077F SYST BP >= 140 MM HG: CPT | Performed by: FAMILY MEDICINE

## 2023-12-20 PROCEDURE — 3080F DIAST BP >= 90 MM HG: CPT | Performed by: FAMILY MEDICINE

## 2023-12-20 PROCEDURE — 73130 X-RAY EXAM OF HAND: CPT | Mod: TC,LT | Performed by: FAMILY MEDICINE

## 2023-12-20 ASSESSMENT — FIBROSIS 4 INDEX: FIB4 SCORE: 2.18

## 2023-12-20 NOTE — PROGRESS NOTES
Subjective:     Chief Complaint   Patient presents with    Follow-Up       HPI: Jimenez is a 53 y.o. male who presents today for the following problems:    Here today for follow-up regarding fluid overload.  He took 2 pills of Lasix day before yesterday, yesterday and he took 2 pills of Lasix today.  He has been taking a potassium chloride tablet with each dose of Lasix.  Denies any chest pain, or shortness of breath today.  He reports that he is feeling overall improved.  Reports improvement in dizziness.  Reports improvement in lower extremity edema.  Has no complaints today.  He is taking his blood pressures at home, blood pressures at home are in the 120s over 80s.  Weights at home are about 208 pounds.    He also reports a left handed fifth finger growth.  It is located on the radial aspect of the fifth digit.  Nonpainful, no bleeding or discharge reported.  It is just uncomfortable and he would like to discuss having it removed.    No problems updated.    Current Outpatient Medications   Medication Sig Dispense Refill    sacubitril-valsartan (ENTRESTO) 24-26 MG Tab Take 1 Tablet by mouth 2 times a day. 60 Tablet 1    clopidogrel (PLAVIX) 75 MG Tab Take 75 mg by mouth every day.      atorvastatin (LIPITOR) 40 MG Tab TAKE 1 TABLET BY MOUTH NIGHTLY      furosemide (LASIX) 20 MG Tab Take 1 Tablet by mouth 1 time a day as needed (fluid retention). 30 Tablet 3    potassium chloride ER (KLOR-CON) 10 MEQ tablet Take 1 Tablet by mouth 1 time a day as needed (only on days taking furosemide). 30 Tablet 3    metoprolol SR (TOPROL XL) 25 MG TABLET SR 24 HR Take 0.5 Tablets by mouth every day. 50 Tablet 3    aspirin 81 MG EC tablet Take 1 Tablet by mouth every day. 100 Tablet 3     No current facility-administered medications for this visit.       Social History     Tobacco Use    Smoking status: Every Day     Current packs/day: 0.25     Average packs/day: 0.3 packs/day for 15.0 years (3.8 ttl pk-yrs)     Types: Cigarettes  "   Smokeless tobacco: Never    Tobacco comments:     3 to 4 cig a day   Vaping Use    Vaping Use: Never used   Substance Use Topics    Alcohol use: Not Currently     Comment: 4 or 5 shots of liquor a day    Drug use: Never        ROS  See HPI for MSK, lungs, heart  Objective:     Vitals:    12/20/23 0938   BP: (!) 141/103   BP Location: Left arm   Patient Position: Sitting   BP Cuff Size: Adult   Pulse: 85   Resp: 14   Temp: 35.8 °C (96.5 °F)   TempSrc: Temporal   SpO2: 95%   Weight: 94.3 kg (208 lb)   Height: 1.727 m (5' 8\")     Body mass index is 31.63 kg/m².     Physical Exam:  Gen: Well developed, well nourished in no acute distress.   Skin: Pink, warm, and dry  HEENT: conjunctiva non-injected  Cardiovascular: Regular rate and rhythm, no murmurs gallops or rubs  Lungs: Clear to auscultation bilaterally, good air movement throughout all lung fields, no wheezes rales or rhonchi  MSK: Left hand: There is a 1 cm x 1 cm radial mass noted in the fifth digit.  Alert and oriented Eye contact is good, speech goal directed, affect calm  Gait: not antalgic    Ultrasound findings in clinic today demonstrate the below picture.        X-rays independently reviewed in clinic.  Assessment & Plan:   No problem-specific Assessment & Plan notes found for this encounter.  1. Chronic diastolic heart failure (HCC)  Patient's overload status appears to be within normal limits at this time.  He is asymptomatic.  Will obtain BMP to ensure that there is no electrolyte abnormalities.  Discussed with patient the importance of monitoring weight, symptoms, and blood pressure.  He will be mindful of the symptoms.  Discussed using Lasix as needed and he verbalized understanding.  He has an appoint with cardiology on January 3.  He discussed with patient that if he remains clinically stable and asymptomatic until that time, he can follow-up with cardiology at that time.  He verbalized understanding and agreement that should he experience any " worsening of symptoms, shortness of breath, chest pain, worsening dizziness that he should proceed to the ER or call 911.  - Basic Metabolic Panel; Future    2. Neoplasm of uncertain behavior  Suspected etiology for patient's neoplasm is tenderness ganglion.  X-ray results reviewed in clinic.  Ultrasound finding does not display any type of loculation.  Physical exam is most consistent with a ganglion type structure.  Discussed with patient that surgical removal is the most likely the best option.  He verbalizes understanding and agreement.  Hand referral placed.  MRI ordered to ensure no abnormal pathology.  - DX-HAND 3+ LEFT; Future  - MR-HAND - W/O LEFT; Future   - Referral to Hand Surgery     Patient verbalizes understanding and agreement with assessment and plan.    Followup: Return in about 3 weeks (around 1/10/2024), or if symptoms worsen or fail to improve.    Theodore Calvo M.D.    Please note that this dictation was created using voice recognition software. I have made every reasonable attempt to correct obvious errors, but I expect that there are errors of grammar and possibly content that I did not discover before finalizing the note.

## 2023-12-22 ENCOUNTER — TELEPHONE (OUTPATIENT)
Dept: CARDIOLOGY | Facility: MEDICAL CENTER | Age: 53
End: 2023-12-22
Payer: MEDICAID

## 2023-12-22 NOTE — TELEPHONE ENCOUNTER
Called pt in regards to lab work that was ordered at previous OV. Patient states They will get labs done  Pt has follow up appointment scheduled with DB on 01/03/2023.

## 2024-01-02 ENCOUNTER — HOSPITAL ENCOUNTER (OUTPATIENT)
Dept: LAB | Facility: MEDICAL CENTER | Age: 54
End: 2024-01-02
Attending: FAMILY MEDICINE
Payer: MEDICAID

## 2024-01-02 DIAGNOSIS — I50.32 CHRONIC DIASTOLIC HEART FAILURE (HCC): ICD-10-CM

## 2024-01-02 LAB
ANION GAP SERPL CALC-SCNC: 12 MMOL/L (ref 7–16)
BUN SERPL-MCNC: 23 MG/DL (ref 8–22)
CALCIUM SERPL-MCNC: 10 MG/DL (ref 8.5–10.5)
CHLORIDE SERPL-SCNC: 102 MMOL/L (ref 96–112)
CO2 SERPL-SCNC: 26 MMOL/L (ref 20–33)
CREAT SERPL-MCNC: 1.43 MG/DL (ref 0.5–1.4)
GFR SERPLBLD CREATININE-BSD FMLA CKD-EPI: 58 ML/MIN/1.73 M 2
GLUCOSE SERPL-MCNC: 153 MG/DL (ref 65–99)
POTASSIUM SERPL-SCNC: 4.5 MMOL/L (ref 3.6–5.5)
SODIUM SERPL-SCNC: 140 MMOL/L (ref 135–145)

## 2024-01-02 PROCEDURE — 36415 COLL VENOUS BLD VENIPUNCTURE: CPT

## 2024-01-02 PROCEDURE — 80048 BASIC METABOLIC PNL TOTAL CA: CPT

## 2024-01-03 ENCOUNTER — OFFICE VISIT (OUTPATIENT)
Dept: CARDIOLOGY | Facility: MEDICAL CENTER | Age: 54
End: 2024-01-03
Attending: NURSE PRACTITIONER
Payer: MEDICAID

## 2024-01-03 VITALS
WEIGHT: 210 LBS | RESPIRATION RATE: 16 BRPM | DIASTOLIC BLOOD PRESSURE: 90 MMHG | BODY MASS INDEX: 31.83 KG/M2 | SYSTOLIC BLOOD PRESSURE: 126 MMHG | HEIGHT: 68 IN | HEART RATE: 94 BPM | OXYGEN SATURATION: 94 %

## 2024-01-03 DIAGNOSIS — I50.9 ACC/AHA STAGE C CONGESTIVE HEART FAILURE DUE TO ISCHEMIC CARDIOMYOPATHY (HCC): ICD-10-CM

## 2024-01-03 DIAGNOSIS — I25.83 CORONARY ARTERY DISEASE DUE TO LIPID RICH PLAQUE: ICD-10-CM

## 2024-01-03 DIAGNOSIS — N17.9 AKI (ACUTE KIDNEY INJURY) (HCC): ICD-10-CM

## 2024-01-03 DIAGNOSIS — Z72.0 TOBACCO USE: ICD-10-CM

## 2024-01-03 DIAGNOSIS — R73.9 HYPERGLYCEMIA: ICD-10-CM

## 2024-01-03 DIAGNOSIS — H93.19 TINNITUS, UNSPECIFIED LATERALITY: ICD-10-CM

## 2024-01-03 DIAGNOSIS — E78.5 DYSLIPIDEMIA: ICD-10-CM

## 2024-01-03 DIAGNOSIS — I25.5 ACC/AHA STAGE C CONGESTIVE HEART FAILURE DUE TO ISCHEMIC CARDIOMYOPATHY (HCC): ICD-10-CM

## 2024-01-03 DIAGNOSIS — I25.10 CORONARY ARTERY DISEASE DUE TO LIPID RICH PLAQUE: ICD-10-CM

## 2024-01-03 DIAGNOSIS — F10.90 ALCOHOL USE DISORDER: ICD-10-CM

## 2024-01-03 DIAGNOSIS — I10 HYPERTENSION, UNSPECIFIED TYPE: ICD-10-CM

## 2024-01-03 PROCEDURE — 99212 OFFICE O/P EST SF 10 MIN: CPT | Performed by: NURSE PRACTITIONER

## 2024-01-03 PROCEDURE — 3080F DIAST BP >= 90 MM HG: CPT | Performed by: NURSE PRACTITIONER

## 2024-01-03 PROCEDURE — 3074F SYST BP LT 130 MM HG: CPT | Performed by: NURSE PRACTITIONER

## 2024-01-03 PROCEDURE — 99214 OFFICE O/P EST MOD 30 MIN: CPT | Performed by: NURSE PRACTITIONER

## 2024-01-03 ASSESSMENT — FIBROSIS 4 INDEX: FIB4 SCORE: 2.18

## 2024-01-03 ASSESSMENT — ENCOUNTER SYMPTOMS
ORTHOPNEA: 0
LOSS OF CONSCIOUSNESS: 0
PALPITATIONS: 0
SHORTNESS OF BREATH: 1
DIZZINESS: 1

## 2024-01-03 NOTE — PROGRESS NOTES
No chief complaint on file.      Subjective     Jimenez Gandara is a 53 y.o. male who presents today for follow-up after recent hospitalization.    Patient recently seen in clinic by Dr. Alcazar on 11/20/2023.  Patient has a history of hypertension, CAD s/p PCI to LAD in October 2023, ischemic cardiomyopathy, CVA, tobacco use, alcohol and methamphetamine abuse and HFrEF.  At his last visit patient was complaining of some episodes of dizziness.  In the hospital patient complaining of increased shortness of breath found to be volume overloaded with elevated BNP and 15 pound weight gain, started on IV diuresis however left AGAINST MEDICAL ADVICE prior to getting to a euvolemic state.  Also was noted to be positive for methamphetamine.    Today in clinic patient continues to complain of persistent orthostatic dizziness, blood pressure at home reported to be 130s.  He also states that he doesn't feel better since being in the hospital.  Has been weighing himself daily and taking Lasix as needed, a few days ago his weight was up to 217 and now has been on Lasix for 3 days with a weight of 210 in the office.  He does get very winded with activity.  Continues to smoke 3 to 4 cigarettes a day, denies any additional alcohol or drug use.  Has been taking all of his medications since discharge.    Past Medical History:   Diagnosis Date    Congestive heart failure (HCC)     Hx of heart artery stent 10/2023    Hypertension      Past Surgical History:   Procedure Laterality Date    OTHER CARDIAC SURGERY       No family history on file.  Social History     Socioeconomic History    Marital status: Single     Spouse name: Not on file    Number of children: Not on file    Years of education: Not on file    Highest education level: Not on file   Occupational History    Not on file   Tobacco Use    Smoking status: Every Day     Current packs/day: 0.25     Average packs/day: 0.3 packs/day for 15.0 years (3.8 ttl pk-yrs)     Types:  Cigarettes    Smokeless tobacco: Never    Tobacco comments:     3 to 4 cig a day   Vaping Use    Vaping Use: Never used   Substance and Sexual Activity    Alcohol use: Not Currently     Comment: 4 or 5 shots of liquor a day    Drug use: Never    Sexual activity: Not on file   Other Topics Concern    Not on file   Social History Narrative    Not on file     Social Determinants of Health     Financial Resource Strain: Not on file   Food Insecurity: Not on file   Transportation Needs: Not on file   Physical Activity: Not on file   Stress: Not on file   Social Connections: Not on file   Intimate Partner Violence: Not on file   Housing Stability: Not on file     No Known Allergies  Outpatient Encounter Medications as of 1/3/2024   Medication Sig Dispense Refill    sacubitril-valsartan (ENTRESTO) 24-26 MG Tab Take 1 Tablet by mouth 2 times a day. 60 Tablet 1    clopidogrel (PLAVIX) 75 MG Tab Take 75 mg by mouth every day.      atorvastatin (LIPITOR) 40 MG Tab TAKE 1 TABLET BY MOUTH NIGHTLY      furosemide (LASIX) 20 MG Tab Take 1 Tablet by mouth 1 time a day as needed (fluid retention). 30 Tablet 3    potassium chloride ER (KLOR-CON) 10 MEQ tablet Take 1 Tablet by mouth 1 time a day as needed (only on days taking furosemide). 30 Tablet 3    metoprolol SR (TOPROL XL) 25 MG TABLET SR 24 HR Take 0.5 Tablets by mouth every day. 50 Tablet 3    aspirin 81 MG EC tablet Take 1 Tablet by mouth every day. 100 Tablet 3     No facility-administered encounter medications on file as of 1/3/2024.     Review of Systems   Respiratory:  Positive for shortness of breath.    Cardiovascular:  Positive for leg swelling. Negative for chest pain, palpitations and orthopnea.   Neurological:  Positive for dizziness. Negative for loss of consciousness.   All other systems reviewed and are negative.             Objective     There were no vitals taken for this visit.    Physical Exam  Vitals reviewed.   Constitutional:       General: He is not in  "acute distress.     Appearance: He is overweight.   Cardiovascular:      Rate and Rhythm: Normal rate and regular rhythm.      Heart sounds: No murmur heard.  Pulmonary:      Effort: Pulmonary effort is normal. No respiratory distress.      Breath sounds: Examination of the right-lower field reveals decreased breath sounds. Examination of the left-lower field reveals decreased breath sounds. Decreased breath sounds present. No rhonchi.   Abdominal:      General: There is no distension.      Tenderness: There is no abdominal tenderness.   Musculoskeletal:      Cervical back: Normal range of motion.      Right lower leg: No edema.      Left lower leg: No edema.   Neurological:      General: No focal deficit present.      Mental Status: He is alert.            No results found for: \"CHOLSTRLTOT\", \"LDL\", \"HDL\", \"TRIGLYCERIDE\"    Lab Results   Component Value Date/Time    SODIUM 140 01/02/2024 03:56 PM    POTASSIUM 4.5 01/02/2024 03:56 PM    CHLORIDE 102 01/02/2024 03:56 PM    CO2 26 01/02/2024 03:56 PM    GLUCOSE 153 (H) 01/02/2024 03:56 PM    BUN 23 (H) 01/02/2024 03:56 PM    CREATININE 1.43 (H) 01/02/2024 03:56 PM    BUNCREATRAT 14.0 10/12/2023 03:48 AM     Lab Results   Component Value Date/Time    ALKPHOSPHAT 87 12/11/2023 03:23 AM    ASTSGOT 49 (H) 12/11/2023 03:23 AM    ALTSGPT 49 12/11/2023 03:23 AM    TBILIRUBIN 1.4 12/11/2023 03:23 AM        TTE 12/10/2023  CONCLUSIONS  Moderate concentric left ventricular hypertrophy. Normal left   ventricular systolic function. The ejection fraction is measured to be   59% by Cabrera's biplane.  Flattened septum in systole and diastole (\"D\"-shaped left ventricle)   consistent with RV pressure and volume overload. Normal diastolic   function.  The right ventricle is mildly dilated. Reduced right ventricular   systolic function. Evidence of Arevalo's sign with hyperdynamic apex   and free wall dysfunction suggestive of RV strain.  Right atrial pressure is estimated to be 15 " mmHg. Estimated right   ventricular systolic pressure is 70 mmHg; severely elevated.  No pericardial effusion.  No prior study is available for comparison.      TTE outside study 7/2023  Conclusion   1. Normal left ventricular cavity size with moderately reduced systolic function. Akinesis of basal and mid inferoseptum and anteroseptum. Severe hypokinesis of inferior wall. Hypokinesis of the remaning wall segments. Moderatley reduced ejection fraction of 35-40% by Cabrera's Biplane. Diastolic function indeterminate, due to fused E/A waves. Normal wall thickness.   2. The valves are normal.   3. Mild to mmoderate right ventricular enlargement with reduced function.   4. Normal left atrial enlargement.   5. Mild to moderate right atrial enlargement.   6. Moderately elevated pulmonary artery pressures with RVSP of 50 mmHg.      [facility] 10/2023  Final impressions:   -Significant mid LAD disease s/p successful IVUS guided PCI with   overlapping 3.5x8mm and 3.0x38mm BONIFACIO.   -Borderline disease of the ramus intermedius, non hemodynamically   significant by iFR.   -LVEDP 4 mmHg, mild gradient across the aortic valve on pullback     Recommendation:   -DAPT for 6 months- 1 year with ASA 81 mg daily and Plavix 75 mg daily   (600 mg of plavix was loaded on the table)   - mL/hr over 5 hour   -High intensity statin   -Cardiac Rehab referral     Assessment & Plan     1. Hypertension, unspecified type        2. Alcohol use disorder        3. Tobacco use        4. ACC/AHA stage C congestive heart failure due to ischemic cardiomyopathy (HCC)        5. Coronary artery disease due to lipid rich plaque            Medical Decision Making: Today's Assessment/Status/Plan:          ICM HFrEF - NYHA II, stage C -continue low-dose Entresto and low-dose metoprolol.  Patient reports persistent dizziness discussed taking metoprolol at night.  Most recent labs show elevated creatinine also patient has not gotten his A1c checked, hesitant to  start additional heart failure medications given patient's persistent dizziness, and abnormal labs.  Will repeat labs in 2 weeks including hemoglobin A1c, follow-up with heart failure clinic in 4 weeks.  If dizziness is improved with metoprolol at night and his kidney function is normal patient should be started on Farxiga and or spironolactone.  Recent echocardiogram in the hospital does show improvement in heart function with recovered EF of 59%.    Hypertension.  Blood pressure borderline elevated, patient has persistent complaints of dizziness, recommend he get a blood pressure cuff at home to monitor blood pressure closely for better titration of GDMT.    CAD s/p PCI 10/2023 - DAPT 12 months thereafter plavix single antiplatelet.  Continue statin.     HLD - threshold goal LDL <70. Continue statin.  No lipid panel on record will order prior to next appointment.    Labs in 2 weeks follow-up in 4 weeks.    Maddy Alvarenga, MSN, APRN  [facility]  [phone]    Please note this dictation was created using voice recognition software.  I have made every reasonable attempt to correct obvious errors, but there may be errors of grammar and possibly content that I did not discover before finalizing the note.

## 2024-01-10 ENCOUNTER — OFFICE VISIT (OUTPATIENT)
Dept: MEDICAL GROUP | Facility: CLINIC | Age: 54
End: 2024-01-10
Payer: MEDICAID

## 2024-01-10 VITALS
BODY MASS INDEX: 32.89 KG/M2 | HEART RATE: 98 BPM | SYSTOLIC BLOOD PRESSURE: 104 MMHG | RESPIRATION RATE: 16 BRPM | HEIGHT: 68 IN | WEIGHT: 217 LBS | OXYGEN SATURATION: 93 % | DIASTOLIC BLOOD PRESSURE: 76 MMHG | TEMPERATURE: 97.5 F

## 2024-01-10 DIAGNOSIS — R42 LIGHT HEADEDNESS: ICD-10-CM

## 2024-01-10 DIAGNOSIS — Z01.10 NORMAL HEARING EXAM: ICD-10-CM

## 2024-01-10 DIAGNOSIS — R42 DIZZINESS: Primary | ICD-10-CM

## 2024-01-10 DIAGNOSIS — I50.32 CHRONIC DIASTOLIC HEART FAILURE (HCC): ICD-10-CM

## 2024-01-10 PROCEDURE — 3078F DIAST BP <80 MM HG: CPT | Performed by: FAMILY MEDICINE

## 2024-01-10 PROCEDURE — 3074F SYST BP LT 130 MM HG: CPT | Performed by: FAMILY MEDICINE

## 2024-01-10 PROCEDURE — 99214 OFFICE O/P EST MOD 30 MIN: CPT | Performed by: FAMILY MEDICINE

## 2024-01-10 RX ORDER — MECLIZINE HCL 12.5 MG/1
25 TABLET ORAL 3 TIMES DAILY PRN
Qty: 30 TABLET | Refills: 2 | Status: SHIPPED | OUTPATIENT
Start: 2024-01-10

## 2024-01-10 ASSESSMENT — ENCOUNTER SYMPTOMS
DIZZINESS: 1
SHORTNESS OF BREATH: 1

## 2024-01-10 ASSESSMENT — FIBROSIS 4 INDEX: FIB4 SCORE: 2.18

## 2024-01-16 ENCOUNTER — APPOINTMENT (OUTPATIENT)
Dept: RADIOLOGY | Facility: CLINIC | Age: 54
End: 2024-01-16
Attending: FAMILY MEDICINE
Payer: MEDICAID

## 2024-01-16 ENCOUNTER — OFFICE VISIT (OUTPATIENT)
Dept: MEDICAL GROUP | Facility: CLINIC | Age: 54
End: 2024-01-16
Payer: MEDICAID

## 2024-01-16 VITALS
HEIGHT: 68 IN | WEIGHT: 215 LBS | TEMPERATURE: 97.8 F | HEART RATE: 79 BPM | OXYGEN SATURATION: 94 % | SYSTOLIC BLOOD PRESSURE: 126 MMHG | BODY MASS INDEX: 32.58 KG/M2 | DIASTOLIC BLOOD PRESSURE: 90 MMHG

## 2024-01-16 DIAGNOSIS — R05.1 ACUTE COUGH: Primary | ICD-10-CM

## 2024-01-16 DIAGNOSIS — R05.1 ACUTE COUGH: ICD-10-CM

## 2024-01-16 DIAGNOSIS — I10 HYPERTENSION, UNSPECIFIED TYPE: ICD-10-CM

## 2024-01-16 DIAGNOSIS — I50.9 ACC/AHA STAGE C CONGESTIVE HEART FAILURE DUE TO ISCHEMIC CARDIOMYOPATHY (HCC): ICD-10-CM

## 2024-01-16 DIAGNOSIS — I25.5 ACC/AHA STAGE C CONGESTIVE HEART FAILURE DUE TO ISCHEMIC CARDIOMYOPATHY (HCC): ICD-10-CM

## 2024-01-16 PROCEDURE — 99214 OFFICE O/P EST MOD 30 MIN: CPT | Performed by: FAMILY MEDICINE

## 2024-01-16 PROCEDURE — 3074F SYST BP LT 130 MM HG: CPT | Performed by: FAMILY MEDICINE

## 2024-01-16 PROCEDURE — 3080F DIAST BP >= 90 MM HG: CPT | Performed by: FAMILY MEDICINE

## 2024-01-16 PROCEDURE — 71046 X-RAY EXAM CHEST 2 VIEWS: CPT | Mod: TC | Performed by: FAMILY MEDICINE

## 2024-01-16 RX ORDER — DOXYCYCLINE HYCLATE 100 MG
100 TABLET ORAL 2 TIMES DAILY
Qty: 10 TABLET | Refills: 0 | Status: SHIPPED | OUTPATIENT
Start: 2024-01-16 | End: 2024-01-21

## 2024-01-16 ASSESSMENT — ENCOUNTER SYMPTOMS
FEVER: 0
COUGH: 1
SHORTNESS OF BREATH: 1

## 2024-01-16 ASSESSMENT — FIBROSIS 4 INDEX: FIB4 SCORE: 2.18

## 2024-01-16 NOTE — PROGRESS NOTES
Subjective:     Chief Complaint   Patient presents with    Follow-Up       HPI: Jimenez is a 53 y.o. male who presents today for the following problems:    Here today for follow-up.  At last office visit, we discussed patient's dizziness episodes and small amount of fluid retention.  Patient returns today with complaints of cough and some dyspnea.  He denies chest pain.  He took furosemide yesterday and the day before.  Denies any fever.  He has had some acute episodes of coughing.  Weights have been in the 207 range to 210 range at home.  Weight is 2 pounds less today from last office visit.    No problems updated.    Current Outpatient Medications   Medication Sig Dispense Refill    sacubitril-valsartan (ENTRESTO) 24-26 MG Tab Take 1 Tablet by mouth 2 times a day. 60 Tablet 1    doxycycline (VIBRAMYCIN) 100 MG Tab Take 1 Tablet by mouth 2 times a day for 5 days. 10 Tablet 0    meclizine (ANTIVERT) 12.5 MG Tab Take 2 Tablets by mouth 3 times a day as needed for Dizziness. 30 Tablet 2    clopidogrel (PLAVIX) 75 MG Tab Take 75 mg by mouth every day.      atorvastatin (LIPITOR) 40 MG Tab TAKE 1 TABLET BY MOUTH NIGHTLY      furosemide (LASIX) 20 MG Tab Take 1 Tablet by mouth 1 time a day as needed (fluid retention). 30 Tablet 3    potassium chloride ER (KLOR-CON) 10 MEQ tablet Take 1 Tablet by mouth 1 time a day as needed (only on days taking furosemide). 30 Tablet 3    metoprolol SR (TOPROL XL) 25 MG TABLET SR 24 HR Take 0.5 Tablets by mouth every day. 50 Tablet 3    aspirin 81 MG EC tablet Take 1 Tablet by mouth every day. 100 Tablet 3     No current facility-administered medications for this visit.       Social History     Tobacco Use    Smoking status: Every Day     Current packs/day: 0.25     Average packs/day: 0.3 packs/day for 15.0 years (3.8 ttl pk-yrs)     Types: Cigarettes    Smokeless tobacco: Never    Tobacco comments:     3 to 4 cig a day   Vaping Use    Vaping Use: Never used   Substance Use Topics     "Alcohol use: Not Currently     Comment: 4 or 5 shots of liquor a day    Drug use: Never        Review of Systems   Constitutional:  Negative for fever.   Respiratory:  Positive for cough and shortness of breath.    Cardiovascular:  Negative for chest pain.       Objective:     Vitals:    01/16/24 0905 01/16/24 0934   BP: (!) 170/108 (!) 126/90   BP Location: Right arm Right arm   Patient Position: Sitting Sitting   BP Cuff Size: Adult Adult   Pulse: 79    Temp: 36.6 °C (97.8 °F)    TempSrc: Temporal    SpO2: 94%    Weight: 97.5 kg (215 lb)    Height: 1.727 m (5' 8\")      Body mass index is 32.69 kg/m².     Physical Exam:  Gen: Well developed, well nourished in no acute distress.   Skin: Pink, warm, and dry  HEENT: conjunctiva non-injected  Cardiovascular: Regular rate and rhythm, no murmurs gallops or rubs  Lungs: Clear to auscultation bilaterally, good air movement throughout all lung fields, no wheezes rales or rhonchi  Alert and oriented Eye contact is good, speech goal directed, affect calm  Gait: not antalgic  Extremities: 1+ pitting edema to midpoint on shins bilaterally        Assessment & Plan:   No problem-specific Assessment & Plan notes found for this encounter.    1. Acute cough  Given that patient's weight today is 2 pounds less than last office visit, x-rays reviewed independently in clinic which show clear costovertebral angles that are significantly improved since the initial CHF exacerbation in December, and patient's vital signs are within normal limits with oxygen saturations within normal limits, there is extremely low likelihood that patient is experiencing a congestive heart failure exacerbation.  Likely, patient experiencing a possible bronchitis versus walking pneumonia.  Will treat by increasing dose of Lasix to 2 pills/day today and tomorrow, patient instructed to take additional days worth of medication on Thursday if his symptoms persist.  Will follow-up on Friday.  ER precautions " discussed and he verbalizes understanding and agreement to go to the emergency room if his symptoms deteriorate.  - DX-CHEST-2 VIEWS; Future  - doxycycline (VIBRAMYCIN) 100 MG Tab; Take 1 Tablet by mouth 2 times a day for 5 days.  Dispense: 10 Tablet; Refill: 0    2. ACC/AHA stage C congestive heart failure due to ischemic cardiomyopathy (HCC)  3. Hypertension, unspecified type  Will treat acute cough as above.  Patient needs refill of Entresto which appears to be controlling his blood pressure well.  This medication appears to be a good match for him given his renal history, congestive heart failure, and hypertension.  Refill provided.  - sacubitril-valsartan (ENTRESTO) 24-26 MG Tab; Take 1 Tablet by mouth 2 times a day.  Dispense: 60 Tablet; Refill: 1     Patient verbalizes understanding and agreement with assessment and plan.    Followup: Return in about 3 days (around 1/19/2024), or if symptoms worsen or fail to improve.    Theodore Calvo M.D.    Please note that this dictation was created using voice recognition software. I have made every reasonable attempt to correct obvious errors, but I expect that there are errors of grammar and possibly content that I did not discover before finalizing the note.

## 2024-01-19 ENCOUNTER — OFFICE VISIT (OUTPATIENT)
Dept: MEDICAL GROUP | Facility: CLINIC | Age: 54
End: 2024-01-19
Payer: MEDICAID

## 2024-01-19 ENCOUNTER — TELEPHONE (OUTPATIENT)
Dept: MEDICAL GROUP | Facility: CLINIC | Age: 54
End: 2024-01-19

## 2024-01-19 VITALS
HEIGHT: 68 IN | DIASTOLIC BLOOD PRESSURE: 127 MMHG | OXYGEN SATURATION: 90 % | HEART RATE: 85 BPM | WEIGHT: 215 LBS | BODY MASS INDEX: 32.58 KG/M2 | SYSTOLIC BLOOD PRESSURE: 166 MMHG | TEMPERATURE: 97.6 F

## 2024-01-19 DIAGNOSIS — I10 PRIMARY HYPERTENSION: ICD-10-CM

## 2024-01-19 DIAGNOSIS — R42 DIZZINESS: ICD-10-CM

## 2024-01-19 DIAGNOSIS — I25.5 ACC/AHA STAGE C CONGESTIVE HEART FAILURE DUE TO ISCHEMIC CARDIOMYOPATHY (HCC): ICD-10-CM

## 2024-01-19 DIAGNOSIS — I50.9 ACC/AHA STAGE C CONGESTIVE HEART FAILURE DUE TO ISCHEMIC CARDIOMYOPATHY (HCC): ICD-10-CM

## 2024-01-19 DIAGNOSIS — R05.1 ACUTE COUGH: Primary | ICD-10-CM

## 2024-01-19 PROCEDURE — 3080F DIAST BP >= 90 MM HG: CPT | Performed by: FAMILY MEDICINE

## 2024-01-19 PROCEDURE — 99214 OFFICE O/P EST MOD 30 MIN: CPT | Performed by: FAMILY MEDICINE

## 2024-01-19 PROCEDURE — 3077F SYST BP >= 140 MM HG: CPT | Performed by: FAMILY MEDICINE

## 2024-01-19 RX ORDER — DOXYCYCLINE HYCLATE 100 MG
100 TABLET ORAL 2 TIMES DAILY
Qty: 4 TABLET | Refills: 0 | Status: SHIPPED | OUTPATIENT
Start: 2024-01-19 | End: 2024-01-21

## 2024-01-19 ASSESSMENT — FIBROSIS 4 INDEX: FIB4 SCORE: 2.18

## 2024-01-19 NOTE — TELEPHONE ENCOUNTER
FINAL PRIOR AUTHORIZATION STATUS:    1.  Name of Medication & Dose: Entresto 24-26 MG tablets     2. Prior Auth Status:   Approvedtoday  PA Case: [id number], Status: Approved, Coverage Starts on: 1/19/2024 12:00:00 AM, Coverage Ends on: 1/18/2025 12:00:00 AM.    3. Action Taken: Pharmacy Notified: yes Patient Notified: yes

## 2024-01-26 ENCOUNTER — OFFICE VISIT (OUTPATIENT)
Dept: MEDICAL GROUP | Facility: CLINIC | Age: 54
End: 2024-01-26
Payer: MEDICAID

## 2024-01-26 VITALS
SYSTOLIC BLOOD PRESSURE: 132 MMHG | HEART RATE: 87 BPM | WEIGHT: 215 LBS | HEIGHT: 68 IN | TEMPERATURE: 97.5 F | BODY MASS INDEX: 32.58 KG/M2 | OXYGEN SATURATION: 91 % | DIASTOLIC BLOOD PRESSURE: 88 MMHG

## 2024-01-26 DIAGNOSIS — I25.5 ACC/AHA STAGE C CONGESTIVE HEART FAILURE DUE TO ISCHEMIC CARDIOMYOPATHY (HCC): ICD-10-CM

## 2024-01-26 DIAGNOSIS — I50.9 ACC/AHA STAGE C CONGESTIVE HEART FAILURE DUE TO ISCHEMIC CARDIOMYOPATHY (HCC): ICD-10-CM

## 2024-01-26 DIAGNOSIS — R42 LIGHT HEADEDNESS: ICD-10-CM

## 2024-01-26 DIAGNOSIS — H60.501 ACUTE OTITIS EXTERNA OF RIGHT EAR, UNSPECIFIED TYPE: Primary | ICD-10-CM

## 2024-01-26 DIAGNOSIS — R42 DIZZINESS: ICD-10-CM

## 2024-01-26 PROCEDURE — 3079F DIAST BP 80-89 MM HG: CPT | Performed by: FAMILY MEDICINE

## 2024-01-26 PROCEDURE — 3075F SYST BP GE 130 - 139MM HG: CPT | Performed by: FAMILY MEDICINE

## 2024-01-26 PROCEDURE — 99214 OFFICE O/P EST MOD 30 MIN: CPT | Performed by: FAMILY MEDICINE

## 2024-01-26 RX ORDER — AMOXICILLIN AND CLAVULANATE POTASSIUM 875; 125 MG/1; MG/1
1 TABLET, FILM COATED ORAL 2 TIMES DAILY
Qty: 14 TABLET | Refills: 0 | Status: SHIPPED | OUTPATIENT
Start: 2024-01-26 | End: 2024-02-02

## 2024-01-26 RX ORDER — CIPROFLOXACIN/HYDROCORTISONE 0.2 %-1 %
3 SUSPENSION, DROPS(FINAL DOSAGE FORM)(ML) OTIC (EAR) 2 TIMES DAILY
Qty: 10 ML | Refills: 0 | Status: SHIPPED | OUTPATIENT
Start: 2024-01-26 | End: 2024-02-02

## 2024-01-26 ASSESSMENT — FIBROSIS 4 INDEX: FIB4 SCORE: 2.18

## 2024-01-26 ASSESSMENT — PATIENT HEALTH QUESTIONNAIRE - PHQ9: CLINICAL INTERPRETATION OF PHQ2 SCORE: 0

## 2024-01-27 NOTE — PROGRESS NOTES
Subjective:     Chief Complaint   Patient presents with    Follow-Up     Follow up from last visit       HPI: Jimenez is a 53 y.o. male who presents today for the following problems:    Patient reports that he feels overall better compared to last office visit.  His cold symptoms are improved and he can breathe better.  He does have some right ear discomfort that was mentioned by his wife.  He poked at his ear with a Q-tip last night and noticed some blood afterwards.  Endorses some dizziness that is returned.    No problems updated.    Current Outpatient Medications   Medication Sig Dispense Refill    ciprofloxacin (CIPRO HC) 0.2-1 % Suspension Administer 3 Drops into the right ear 2 times a day for 7 days. Administer drops to both ears. 10 mL 0    amoxicillin-clavulanate (AUGMENTIN) 875-125 MG Tab Take 1 Tablet by mouth 2 times a day for 7 days. 14 Tablet 0    meclizine (ANTIVERT) 12.5 MG Tab Take 2 Tablets by mouth 3 times a day as needed for Dizziness. 30 Tablet 2    clopidogrel (PLAVIX) 75 MG Tab Take 75 mg by mouth every day.      atorvastatin (LIPITOR) 40 MG Tab TAKE 1 TABLET BY MOUTH NIGHTLY      furosemide (LASIX) 20 MG Tab Take 1 Tablet by mouth 1 time a day as needed (fluid retention). 30 Tablet 3    potassium chloride ER (KLOR-CON) 10 MEQ tablet Take 1 Tablet by mouth 1 time a day as needed (only on days taking furosemide). 30 Tablet 3    metoprolol SR (TOPROL XL) 25 MG TABLET SR 24 HR Take 0.5 Tablets by mouth every day. 50 Tablet 3    aspirin 81 MG EC tablet Take 1 Tablet by mouth every day. 100 Tablet 3    sacubitril-valsartan (ENTRESTO) 24-26 MG Tab Take 1 Tablet by mouth 2 times a day. (Patient not taking: Reported on 1/19/2024) 60 Tablet 1     No current facility-administered medications for this visit.       Social History     Tobacco Use    Smoking status: Every Day     Current packs/day: 0.25     Average packs/day: 0.3 packs/day for 15.0 years (3.8 ttl pk-yrs)     Types: Cigarettes    Smokeless  "tobacco: Never    Tobacco comments:     3 to 4 cig a day   Vaping Use    Vaping Use: Never used   Substance Use Topics    Alcohol use: Not Currently     Comment: 4 or 5 shots of liquor a day    Drug use: Never        ROS  See HPI for cardiovascular, respiratory, HEENT.      Vitals:    01/26/24 0827   BP: 132/88   BP Location: Right arm   Patient Position: Sitting   Pulse: 87   Temp: 36.4 °C (97.5 °F)   TempSrc: Temporal   SpO2: 91%   Weight: 97.5 kg (215 lb)   Height: 1.727 m (5' 8\")     Body mass index is 32.69 kg/m².     Physical Exam:  Gen: Well developed, well nourished in no acute distress.   Skin: Pink, warm, and dry  HEENT: conjunctiva non-injected  Cardiovascular: Regular rate and rhythm, no murmurs gallops or rubs  Lungs: Clear to auscultation bilaterally, no wheezes rales or rhonchi  Ext: Trace edema noted to the distal shins laterally.  Alert and oriented Eye contact is good, speech goal directed, affect calm  Gait: not antalgic    Assessment & Plan:   No problem-specific Assessment & Plan notes found for this encounter.    1. Acute otitis externa of right ear, unspecified type  Right ear does demonstrate some discharge.  Unable to see the tympanic membrane due to cerumen as well as some discharge.  Given that there is no ear pain reported, and that he is afebrile, very low likelihood for perforated tympanic membrane.  Will treat with systemic and topical antibiotics.  Will reevaluate in 1 week time.  Discussed with patient and wife importance of continual monitoring.  Strict ER precautions and return to clinic precautions provided.  Anaphylaxis possibility discussed and ER precautions provided as well.  They verbalized understanding.  Counseled against using Q-tips to clean ears.  - ciprofloxacin (CIPRO HC) 0.2-1 % Suspension; Administer 3 Drops into the right ear 2 times a day for 7 days. Administer drops to both ears.  Dispense: 10 mL; Refill: 0  - amoxicillin-clavulanate (AUGMENTIN) 875-125 MG Tab; " Take 1 Tablet by mouth 2 times a day for 7 days.  Dispense: 14 Tablet; Refill: 0    2. Light headedness  3. Dizziness  Lightheadedness and dizziness may be secondary to acute otitis sterna.  Will treat as above and determine if there is any improvement.    4. ACC/AHA stage C congestive heart failure due to ischemic cardiomyopathy (HCC)  Patient's weight appears to be slightly elevated today.  Given that he took 2 tablets of furosemide today we will continue with allowing urination today and then tomorrow we will begin daily use of furosemide given that he has needed several episodes of diuresis.  Goal weight is unchanged.  205 pounds to 210 pounds.  Will follow-up in 1 week's time.    Patient verbalizes understanding and agreement with assessment and plan.    Followup: Return in about 1 week (around 2/2/2024), or if symptoms worsen or fail to improve.    Theodore Calvo M.D.    Please note that this dictation was created using voice recognition software. I have made every reasonable attempt to correct obvious errors, but I expect that there are errors of grammar and possibly content that I did not discover before finalizing the note.

## 2024-01-29 ENCOUNTER — TELEPHONE (OUTPATIENT)
Dept: MEDICAL GROUP | Facility: CLINIC | Age: 54
End: 2024-01-29
Payer: MEDICAID

## 2024-01-29 NOTE — TELEPHONE ENCOUNTER
I got a call from [facility] pharmacy regarding ear drops that were sent in.  They are not covered by his insurance and they are requesting an alternative rx be sent in.

## 2024-01-30 NOTE — TELEPHONE ENCOUNTER
Called and notified pt about rx change and to check with his pharmacy, pt verablized understanding.

## 2024-02-02 ENCOUNTER — APPOINTMENT (OUTPATIENT)
Dept: MEDICAL GROUP | Facility: CLINIC | Age: 54
End: 2024-02-02
Payer: MEDICAID

## 2024-02-05 ENCOUNTER — HOSPITAL ENCOUNTER (OUTPATIENT)
Dept: LAB | Facility: MEDICAL CENTER | Age: 54
End: 2024-02-05
Attending: NURSE PRACTITIONER
Payer: MEDICAID

## 2024-02-05 DIAGNOSIS — R73.9 HYPERGLYCEMIA: ICD-10-CM

## 2024-02-05 DIAGNOSIS — I50.9 ACC/AHA STAGE C CONGESTIVE HEART FAILURE DUE TO ISCHEMIC CARDIOMYOPATHY (HCC): ICD-10-CM

## 2024-02-05 DIAGNOSIS — E78.5 DYSLIPIDEMIA: ICD-10-CM

## 2024-02-05 DIAGNOSIS — I25.5 ACC/AHA STAGE C CONGESTIVE HEART FAILURE DUE TO ISCHEMIC CARDIOMYOPATHY (HCC): ICD-10-CM

## 2024-02-05 DIAGNOSIS — N17.9 AKI (ACUTE KIDNEY INJURY) (HCC): ICD-10-CM

## 2024-02-05 LAB
ANION GAP SERPL CALC-SCNC: 12 MMOL/L (ref 7–16)
BUN SERPL-MCNC: 27 MG/DL (ref 8–22)
CALCIUM SERPL-MCNC: 9.6 MG/DL (ref 8.5–10.5)
CHLORIDE SERPL-SCNC: 102 MMOL/L (ref 96–112)
CHOLEST SERPL-MCNC: 116 MG/DL (ref 100–199)
CO2 SERPL-SCNC: 23 MMOL/L (ref 20–33)
CREAT SERPL-MCNC: 1.67 MG/DL (ref 0.5–1.4)
EST. AVERAGE GLUCOSE BLD GHB EST-MCNC: 186 MG/DL
FASTING STATUS PATIENT QL REPORTED: NORMAL
GFR SERPLBLD CREATININE-BSD FMLA CKD-EPI: 48 ML/MIN/1.73 M 2
GLUCOSE SERPL-MCNC: 174 MG/DL (ref 65–99)
HBA1C MFR BLD: 8.1 % (ref 4–5.6)
HDLC SERPL-MCNC: 31 MG/DL
LDLC SERPL CALC-MCNC: 68 MG/DL
NT-PROBNP SERPL IA-MCNC: 3991 PG/ML (ref 0–125)
POTASSIUM SERPL-SCNC: 4.3 MMOL/L (ref 3.6–5.5)
SODIUM SERPL-SCNC: 137 MMOL/L (ref 135–145)
TRIGL SERPL-MCNC: 85 MG/DL (ref 0–149)

## 2024-02-05 PROCEDURE — 36415 COLL VENOUS BLD VENIPUNCTURE: CPT

## 2024-02-05 PROCEDURE — 80061 LIPID PANEL: CPT

## 2024-02-05 PROCEDURE — 83036 HEMOGLOBIN GLYCOSYLATED A1C: CPT

## 2024-02-05 PROCEDURE — 80048 BASIC METABOLIC PNL TOTAL CA: CPT

## 2024-02-05 PROCEDURE — 83880 ASSAY OF NATRIURETIC PEPTIDE: CPT

## 2024-02-06 ENCOUNTER — APPOINTMENT (OUTPATIENT)
Dept: CARDIOLOGY | Facility: MEDICAL CENTER | Age: 54
End: 2024-02-06
Attending: NURSE PRACTITIONER
Payer: MEDICAID

## 2024-02-08 ENCOUNTER — APPOINTMENT (OUTPATIENT)
Dept: MEDICAL GROUP | Facility: CLINIC | Age: 54
End: 2024-02-08
Payer: MEDICAID

## 2024-02-12 ENCOUNTER — TELEPHONE (OUTPATIENT)
Dept: CARDIOLOGY | Facility: MEDICAL CENTER | Age: 54
End: 2024-02-12
Payer: MEDICAID

## 2024-02-12 NOTE — TELEPHONE ENCOUNTER
Upon chart review, pt is active on [facility].  Last login 2/9/2024.  Droplet message sent regarding DB advise, awaiting pt response.

## 2024-02-12 NOTE — TELEPHONE ENCOUNTER
----- Message from Sho Gomes R.N. sent at 2/8/2024 10:45 AM PST -----    ----- Message -----  From: GEORGIA Proctor  Sent: 2/8/2024   9:30 AM PST  To: Sho Gomes R.N.    Reviewed abnormal results.   Your A1c which shows how well your diabetes is controlled is higher than it had been previously.  Please discuss this with your primary for better management.  In addition your kidney function has decreased slightly, you can discuss this both with your primary and on your follow-up with the heart failure doctor on March 1.  Please do not miss either of these appointments.  Thank you.

## 2024-02-13 ENCOUNTER — OFFICE VISIT (OUTPATIENT)
Dept: MEDICAL GROUP | Facility: CLINIC | Age: 54
End: 2024-02-13
Payer: MEDICAID

## 2024-02-13 VITALS
TEMPERATURE: 97.2 F | WEIGHT: 208 LBS | BODY MASS INDEX: 31.52 KG/M2 | HEIGHT: 68 IN | OXYGEN SATURATION: 92 % | HEART RATE: 93 BPM | SYSTOLIC BLOOD PRESSURE: 110 MMHG | RESPIRATION RATE: 20 BRPM | DIASTOLIC BLOOD PRESSURE: 72 MMHG

## 2024-02-13 DIAGNOSIS — I10 PRIMARY HYPERTENSION: ICD-10-CM

## 2024-02-13 DIAGNOSIS — H61.21 IMPACTED CERUMEN OF RIGHT EAR: ICD-10-CM

## 2024-02-13 DIAGNOSIS — I50.9 ACC/AHA STAGE C CONGESTIVE HEART FAILURE DUE TO ISCHEMIC CARDIOMYOPATHY (HCC): ICD-10-CM

## 2024-02-13 DIAGNOSIS — R42 DIZZINESS: Primary | ICD-10-CM

## 2024-02-13 DIAGNOSIS — I25.5 ACC/AHA STAGE C CONGESTIVE HEART FAILURE DUE TO ISCHEMIC CARDIOMYOPATHY (HCC): ICD-10-CM

## 2024-02-13 DIAGNOSIS — R06.02 SHORTNESS OF BREATH: ICD-10-CM

## 2024-02-13 PROCEDURE — 99214 OFFICE O/P EST MOD 30 MIN: CPT | Performed by: FAMILY MEDICINE

## 2024-02-13 PROCEDURE — 3074F SYST BP LT 130 MM HG: CPT | Performed by: FAMILY MEDICINE

## 2024-02-13 PROCEDURE — 3078F DIAST BP <80 MM HG: CPT | Performed by: FAMILY MEDICINE

## 2024-02-13 ASSESSMENT — FIBROSIS 4 INDEX: FIB4 SCORE: 2.21

## 2024-02-13 NOTE — PROGRESS NOTES
Subjective:     Chief Complaint   Patient presents with    Follow-Up    Dizziness    Shortness of Breath       HPI: Jimenez is a 54 y.o. male who presents today for the following problems:    Here today with some improvement in weight and symptoms generally however still having dizziness.  He has completed his course of Augmentin as well as eardrops.  Dizziness is still present and he has an evaluation with vestibular testing soon.  Today's weight is the best that it has been since I started seeing him however he still endorses some dyspnea.  He reports that his pedal edema has improved.  He does note that his blood pressure has been low recently with systolic blood pressure below the 100s.  He also endorses that his fluid intake has been less than 1.5 to 2 L daily.    No problems updated.    Current Outpatient Medications   Medication Sig Dispense Refill    sacubitril-valsartan (ENTRESTO) 24-26 MG Tab Take 1 Tablet by mouth 2 times a day. 60 Tablet 1    meclizine (ANTIVERT) 12.5 MG Tab Take 2 Tablets by mouth 3 times a day as needed for Dizziness. 30 Tablet 2    clopidogrel (PLAVIX) 75 MG Tab Take 75 mg by mouth every day.      atorvastatin (LIPITOR) 40 MG Tab TAKE 1 TABLET BY MOUTH NIGHTLY      furosemide (LASIX) 20 MG Tab Take 1 Tablet by mouth 1 time a day as needed (fluid retention). 30 Tablet 3    potassium chloride ER (KLOR-CON) 10 MEQ tablet Take 1 Tablet by mouth 1 time a day as needed (only on days taking furosemide). 30 Tablet 3    metoprolol SR (TOPROL XL) 25 MG TABLET SR 24 HR Take 0.5 Tablets by mouth every day. 50 Tablet 3    aspirin 81 MG EC tablet Take 1 Tablet by mouth every day. 100 Tablet 3     No current facility-administered medications for this visit.       Social History     Tobacco Use    Smoking status: Every Day     Current packs/day: 0.25     Average packs/day: 0.3 packs/day for 15.0 years (3.8 ttl pk-yrs)     Types: Cigarettes    Smokeless tobacco: Never    Tobacco comments:     3 to 4  "cig a day   Vaping Use    Vaping Use: Never used   Substance Use Topics    Alcohol use: Not Currently     Comment: 4 or 5 shots of liquor a day    Drug use: Never          Objective:     Vitals:    02/13/24 1135   BP: 110/72   BP Location: Right arm   Patient Position: Sitting   BP Cuff Size: Adult   Pulse: 93   Resp: 20   Temp: 36.2 °C (97.2 °F)   TempSrc: Temporal   SpO2: 92%   Weight: 94.3 kg (208 lb)   Height: 1.727 m (5' 8\")     Body mass index is 31.63 kg/m².     Physical Exam:  Gen: Well developed, well nourished in no acute distress.   Skin: Pink, warm, and dry  HEENT: conjunctiva non-injected; Right TM unable to visualize due to ear wax; Left Tm pearly gray without bulging or erythema  Cardiovascular: Regular rate and rhythm, no murmurs gallops or rubs  Lungs: Clear to auscultation bilaterally, no wheezes rales or rhonchi  Ext: Trace edema noted to the distal shins bilaterally.  Alert and oriented Eye contact is good, speech goal directed, affect calm  Gait: not antalgic    Assessment & Plan:   No problem-specific Assessment & Plan notes found for this encounter.    1. Dizziness  2. Impacted cerumen of right ear  Patient's dizziness may be secondary to dehydration and possibly to impacted cerumen.  Some earwax was able to be removed from clinic today however, given that it does appear to be pressed pretty tightly to the eardrum removal is difficult.  Counseled use of Debrox eardrops to facilitate easier removal.  Patient to continue with audiology vestibular evaluation.  Counseled patient to schedule appointment with ear nose and throat physician.    3. Primary hypertension  Will decrease dose of Entresto to once daily.  Continue blood pressure log.  Goal blood pressure range systolic between 120-130/80.    4. Shortness of breath  Patient appears to be euvolemic at this time.  Given that he is short of breath despite completion of antibiotic therapy without any systemic signs or symptoms of illness there " may be a component of COPD.  Counseled patient to stop smoking.  He says he will try.  Will order pulmonary function tests.  - PULMONARY FUNCTION TESTS -Test requested: Complete Pulmonary Function Test; Future    5. ACC/AHA stage C congestive heart failure due to ischemic cardiomyopathy (HCC)  Follow-up with cardiology as planned.  Continue with Lasix daily and potassium replacement.  Appears to be euvolemic at this time.  Weight within normal range for himself.    Patient verbalizes understanding and agreement with assessment and plan.    Followup: Return in about 2 weeks (around 2/27/2024), or if symptoms worsen or fail to improve.    Theodore Calvo M.D.    Please note that this dictation was created using voice recognition software. I have made every reasonable attempt to correct obvious errors, but I expect that there are errors of grammar and possibly content that I did not discover before finalizing the note.

## 2024-03-01 ENCOUNTER — TELEPHONE (OUTPATIENT)
Dept: CARDIOLOGY | Facility: MEDICAL CENTER | Age: 54
End: 2024-03-01

## 2024-03-01 ENCOUNTER — OFFICE VISIT (OUTPATIENT)
Dept: CARDIOLOGY | Facility: MEDICAL CENTER | Age: 54
End: 2024-03-01
Attending: NURSE PRACTITIONER
Payer: MEDICAID

## 2024-03-01 VITALS
HEART RATE: 93 BPM | DIASTOLIC BLOOD PRESSURE: 100 MMHG | RESPIRATION RATE: 20 BRPM | SYSTOLIC BLOOD PRESSURE: 138 MMHG | WEIGHT: 215.4 LBS | HEIGHT: 68 IN | OXYGEN SATURATION: 96 % | BODY MASS INDEX: 32.64 KG/M2

## 2024-03-01 DIAGNOSIS — I10 HTN (HYPERTENSION), MALIGNANT: ICD-10-CM

## 2024-03-01 DIAGNOSIS — Z72.0 TOBACCO USE: ICD-10-CM

## 2024-03-01 DIAGNOSIS — R06.09 DYSPNEA ON EXERTION: ICD-10-CM

## 2024-03-01 DIAGNOSIS — R73.9 HYPERGLYCEMIA: ICD-10-CM

## 2024-03-01 DIAGNOSIS — I27.20 PULMONARY HYPERTENSION (HCC): ICD-10-CM

## 2024-03-01 DIAGNOSIS — F15.10 METHAMPHETAMINE ABUSE (HCC): ICD-10-CM

## 2024-03-01 DIAGNOSIS — F10.90 ALCOHOL USE DISORDER: ICD-10-CM

## 2024-03-01 DIAGNOSIS — I51.89 LEFT VENTRICULAR DIASTOLIC DYSFUNCTION, NYHA CLASS 3: ICD-10-CM

## 2024-03-01 DIAGNOSIS — I50.30 ACC/AHA STAGE C HEART FAILURE WITH PRESERVED EJECTION FRACTION (HCC): ICD-10-CM

## 2024-03-01 DIAGNOSIS — N18.31 STAGE 3A CHRONIC KIDNEY DISEASE: ICD-10-CM

## 2024-03-01 DIAGNOSIS — I25.5 ACC/AHA STAGE C CONGESTIVE HEART FAILURE DUE TO ISCHEMIC CARDIOMYOPATHY (HCC): ICD-10-CM

## 2024-03-01 DIAGNOSIS — I25.119 CORONARY ARTERY DISEASE INVOLVING NATIVE CORONARY ARTERY OF NATIVE HEART WITH ANGINA PECTORIS (HCC): ICD-10-CM

## 2024-03-01 DIAGNOSIS — I50.9 ACC/AHA STAGE C CONGESTIVE HEART FAILURE DUE TO ISCHEMIC CARDIOMYOPATHY (HCC): ICD-10-CM

## 2024-03-01 DIAGNOSIS — Z95.5 STENTED CORONARY ARTERY: ICD-10-CM

## 2024-03-01 PROCEDURE — 99214 OFFICE O/P EST MOD 30 MIN: CPT | Mod: 25 | Performed by: INTERNAL MEDICINE

## 2024-03-01 PROCEDURE — 99406 BEHAV CHNG SMOKING 3-10 MIN: CPT | Performed by: NURSE PRACTITIONER

## 2024-03-01 PROCEDURE — 3080F DIAST BP >= 90 MM HG: CPT | Performed by: INTERNAL MEDICINE

## 2024-03-01 PROCEDURE — 99214 OFFICE O/P EST MOD 30 MIN: CPT | Performed by: INTERNAL MEDICINE

## 2024-03-01 PROCEDURE — 3075F SYST BP GE 130 - 139MM HG: CPT | Performed by: INTERNAL MEDICINE

## 2024-03-01 PROCEDURE — 99406 BEHAV CHNG SMOKING 3-10 MIN: CPT | Performed by: INTERNAL MEDICINE

## 2024-03-01 RX ORDER — SACUBITRIL AND VALSARTAN 49; 51 MG/1; MG/1
1 TABLET, FILM COATED ORAL 2 TIMES DAILY
Qty: 60 TABLET | Refills: 11 | Status: SHIPPED | OUTPATIENT
Start: 2024-03-01

## 2024-03-01 RX ORDER — FUROSEMIDE 20 MG/1
20 TABLET ORAL
Qty: 30 TABLET | Refills: 3 | Status: SHIPPED | OUTPATIENT
Start: 2024-03-01

## 2024-03-01 RX ORDER — POTASSIUM CHLORIDE 750 MG/1
10 TABLET, FILM COATED, EXTENDED RELEASE ORAL
Qty: 30 TABLET | Refills: 3 | Status: SHIPPED | OUTPATIENT
Start: 2024-03-01

## 2024-03-01 RX ORDER — METOPROLOL SUCCINATE 50 MG/1
50 TABLET, EXTENDED RELEASE ORAL DAILY
Qty: 100 TABLET | Refills: 4 | Status: SHIPPED | OUTPATIENT
Start: 2024-03-01

## 2024-03-01 RX ORDER — EMPAGLIFLOZIN 10 MG/1
10 TABLET, FILM COATED ORAL DAILY
Qty: 90 TABLET | Refills: 4 | Status: SHIPPED | OUTPATIENT
Start: 2024-03-01

## 2024-03-01 ASSESSMENT — ENCOUNTER SYMPTOMS
CLAUDICATION: 0
BRUISES/BLEEDS EASILY: 0
DOUBLE VISION: 0
DIZZINESS: 0
CHILLS: 0
DEPRESSION: 0
VOMITING: 0
SENSORY CHANGE: 0
WEIGHT LOSS: 0
HALLUCINATIONS: 0
HEADACHES: 0
SPEECH CHANGE: 0
SHORTNESS OF BREATH: 1
LOSS OF CONSCIOUSNESS: 0
BLURRED VISION: 0
PALPITATIONS: 0
COUGH: 0
PND: 0
BLOOD IN STOOL: 0
MYALGIAS: 0
ABDOMINAL PAIN: 0
NAUSEA: 0
ORTHOPNEA: 0
EYE PAIN: 0
EYE DISCHARGE: 0
FALLS: 0
FEVER: 0

## 2024-03-01 ASSESSMENT — MINNESOTA LIVING WITH HEART FAILURE QUESTIONNAIRE (MLHF)
SWELLING IN ANKLES OR LEGS: 3
COSTING YOU MONEY FOR MEDICAL CARE: 2
DIFFICULTY TO CONCENTRATE OR REMEMBERING THINGS: 3
DIFFICULTY WITH RECREATIONAL PASTIMES, SPORTS, HOBBIES: 5
FEELING LIKE A BURDEN TO FAMILY AND FRIENDS: 5
EATING LESS FOODS YOU LIKE: 3
MAKING YOU SHORT OF BREATH: 5
WORKING AROUND THE HOUSE OR YARD DIFFICULT: 5
MAKING YOU STAY IN A HOSPITAL: 0
DIFFICULTY WORKING TO EARN A LIVING: 5
MAKING YOU FEEL DEPRESSED: 5
WALKING ABOUT OR CLIMBING STAIRS DIFFICULT: 5
DIFFICULTY WITH SEXUAL ACTIVITIES: 5
MAKING YOU WORRY: 5
DIFFICULTY GOING AWAY FROM HOME: 5
TIRED, FATIGUED OR LOW ON ENERGY: 4
GIVING YOU SIDE EFFECTS FROM TREATMENTS: 4
DIFFICULTY SOCIALIZING WITH FAMILY OR FRIENDS: 5
LOSS OF SELF CONTROL IN YOUR LIFE: 5
HAVING TO SIT OR LIE DOWN DURING THE DAY: 4
DIFFICULTY SLEEPING WELL AT NIGHT: 3
TOTAL_SCORE: 86

## 2024-03-01 ASSESSMENT — FIBROSIS 4 INDEX: FIB4 SCORE: 2.21

## 2024-03-01 NOTE — PROGRESS NOTES
Chief Complaint   Patient presents with    Congestive Heart Failure       Subjective     Jimenez Gandara is a 54 y.o. male who presents today for cardiac care and management in heart failure clinic due to current diagnosis of heart failure with preserved ejection fraction, pulmonary hypertension, elevated NT proBNP.  Does have history of methamphetamine use.  Patient also has prior history of hypertension, established coronary artery disease status post PCI intervention of the LAD in October 2023, prior history of ischemic cardiomyopathy with prior reduced ejection fraction before PCI of LAD, CVA.    Patient still gets winded with daily living activities and exertion. No symptoms at rest.    I personally interpreted blood test results which showed GFR of 48, NT proBNP of 3991, potassium of 4.3 LDL of 68, triglyceride of 85.  Patient was also positive for methamphetamine in his urine in December 2023.    I have independently interpreted and reviewed echocardiogram's actual images with patient which showed normal left ventricular systolic function. No wall motion abnormality. Elevated RVSP. No significant valvular disease.      Past Medical History:   Diagnosis Date    Congestive heart failure (HCC)     Hx of heart artery stent 10/2023    Hypertension      Past Surgical History:   Procedure Laterality Date    OTHER CARDIAC SURGERY       History reviewed. No pertinent family history.  Social History     Socioeconomic History    Marital status: Single     Spouse name: Not on file    Number of children: Not on file    Years of education: Not on file    Highest education level: Not on file   Occupational History    Not on file   Tobacco Use    Smoking status: Every Day     Current packs/day: 0.25     Average packs/day: 0.3 packs/day for 15.0 years (3.8 ttl pk-yrs)     Types: Cigarettes    Smokeless tobacco: Never    Tobacco comments:     3 to 4 cig a day   Vaping Use    Vaping Use: Never used   Substance and Sexual  Activity    Alcohol use: Not Currently     Comment: 4 or 5 shots of liquor a day    Drug use: Never    Sexual activity: Not on file   Other Topics Concern    Not on file   Social History Narrative    Not on file     Social Determinants of Health     Financial Resource Strain: Not on file   Food Insecurity: Not on file   Transportation Needs: Not on file   Physical Activity: Not on file   Stress: Not on file   Social Connections: Not on file   Intimate Partner Violence: Not on file   Housing Stability: Not on file     No Known Allergies  Outpatient Encounter Medications as of 3/1/2024   Medication Sig Dispense Refill    metoprolol SR (TOPROL XL) 50 MG TABLET SR 24 HR Take 1 Tablet by mouth every day. 100 Tablet 4    Empagliflozin (JARDIANCE) 10 MG Tab tablet Take 1 Tablet by mouth every day. 90 Tablet 4    furosemide (LASIX) 20 MG Tab Take 1 Tablet by mouth 1 time a day as needed (fluid retention). 30 Tablet 3    potassium chloride ER (KLOR-CON) 10 MEQ tablet Take 1 Tablet by mouth 1 time a day as needed (only on days taking furosemide). 30 Tablet 3    sacubitril-valsartan (ENTRESTO) 49-51 MG Tab Take 1 Tablet by mouth 2 times a day. 60 Tablet 11    meclizine (ANTIVERT) 12.5 MG Tab Take 2 Tablets by mouth 3 times a day as needed for Dizziness. 30 Tablet 2    clopidogrel (PLAVIX) 75 MG Tab Take 75 mg by mouth every day.      atorvastatin (LIPITOR) 40 MG Tab TAKE 1 TABLET BY MOUTH NIGHTLY      aspirin 81 MG EC tablet Take 1 Tablet by mouth every day. 100 Tablet 3    [DISCONTINUED] sacubitril-valsartan (ENTRESTO) 24-26 MG Tab Take 1 Tablet by mouth 2 times a day. 60 Tablet 1    [DISCONTINUED] furosemide (LASIX) 20 MG Tab Take 1 Tablet by mouth 1 time a day as needed (fluid retention). 30 Tablet 3    [DISCONTINUED] potassium chloride ER (KLOR-CON) 10 MEQ tablet Take 1 Tablet by mouth 1 time a day as needed (only on days taking furosemide). 30 Tablet 3    [DISCONTINUED] metoprolol SR (TOPROL XL) 25 MG TABLET SR 24 HR Take  "0.5 Tablets by mouth every day. 50 Tablet 3     No facility-administered encounter medications on file as of 3/1/2024.     Review of Systems   Constitutional:  Negative for chills, fever, malaise/fatigue and weight loss.   HENT:  Negative for ear discharge, ear pain, hearing loss and nosebleeds.    Eyes:  Negative for blurred vision, double vision, pain and discharge.   Respiratory:  Positive for shortness of breath. Negative for cough.    Cardiovascular:  Negative for chest pain, palpitations, orthopnea, claudication, leg swelling and PND.   Gastrointestinal:  Negative for abdominal pain, blood in stool, melena, nausea and vomiting.   Genitourinary:  Negative for dysuria and hematuria.   Musculoskeletal:  Negative for falls, joint pain and myalgias.   Skin:  Negative for itching and rash.   Neurological:  Negative for dizziness, sensory change, speech change, loss of consciousness and headaches.   Endo/Heme/Allergies:  Negative for environmental allergies. Does not bruise/bleed easily.   Psychiatric/Behavioral:  Negative for depression, hallucinations and suicidal ideas.               Objective     BP (!) 138/100 (BP Location: Left arm, Patient Position: Sitting, BP Cuff Size: Adult)   Pulse 93   Resp 20   Ht 1.727 m (5' 8\")   Wt 97.7 kg (215 lb 6.4 oz)   SpO2 96%   BMI 32.75 kg/m²     Physical Exam  Vitals and nursing note reviewed.   Constitutional:       General: He is not in acute distress.     Appearance: He is not diaphoretic.   HENT:      Head: Normocephalic and atraumatic.      Right Ear: External ear normal.      Left Ear: External ear normal.      Nose: No congestion or rhinorrhea.   Eyes:      General:         Right eye: No discharge.         Left eye: No discharge.   Neck:      Thyroid: No thyromegaly.      Vascular: No JVD.   Cardiovascular:      Rate and Rhythm: Normal rate and regular rhythm.      Pulses: Normal pulses.   Pulmonary:      Effort: No respiratory distress.   Abdominal:      " General: There is no distension.      Tenderness: There is no abdominal tenderness.   Musculoskeletal:         General: No swelling or tenderness.      Right lower leg: No edema.      Left lower leg: No edema.   Skin:     General: Skin is warm and dry.   Neurological:      Mental Status: He is alert and oriented to person, place, and time.      Cranial Nerves: No cranial nerve deficit.   Psychiatric:         Behavior: Behavior normal.                Assessment & Plan     1. ACC/AHA stage C heart failure with preserved ejection fraction (HCC)  metoprolol SR (TOPROL XL) 50 MG TABLET SR 24 HR    Empagliflozin (JARDIANCE) 10 MG Tab tablet    REFERRAL TO CARDIOLOGY - HEART FAILURE NURSING EDUCATION    sacubitril-valsartan (ENTRESTO) 49-51 MG Tab      2. Left ventricular diastolic dysfunction, NYHA class 3  metoprolol SR (TOPROL XL) 50 MG TABLET SR 24 HR    Empagliflozin (JARDIANCE) 10 MG Tab tablet    REFERRAL TO CARDIOLOGY - HEART FAILURE NURSING EDUCATION    sacubitril-valsartan (ENTRESTO) 49-51 MG Tab      3. Pulmonary hypertension (HCC)        4. Stage 3a chronic kidney disease (HCC)  Empagliflozin (JARDIANCE) 10 MG Tab tablet      5. Hyperglycemia        6. Alcohol use disorder        7. Tobacco use        8. Methamphetamine abuse ([facility])        9. HTN (hypertension), malignant  metoprolol SR (TOPROL XL) 50 MG TABLET SR 24 HR    sacubitril-valsartan (ENTRESTO) 49-51 MG Tab      10. Coronary artery disease involving native coronary artery of native heart with angina pectoris ([facility])  Empagliflozin (JARDIANCE) 10 MG Tab tablet      11. Stented coronary artery  Empagliflozin (JARDIANCE) 10 MG Tab tablet      12. Dyspnea on exertion  Basic Metabolic Panel    proBrain Natriuretic Peptide, NT      13. ACC/AHA stage C congestive heart failure due to ischemic cardiomyopathy (HCC)  furosemide (LASIX) 20 MG Tab    potassium chloride ER (KLOR-CON) 10 MEQ tablet          Medical Decision Making: Today's  Assessment/Status/Plan:   Ischemic Cardiomyopathy with normalization of LVEF:  Chronically illed condition which requires ongoing close monitoring and treatment to improve survival rate along with decreasing risk of clinical decompensation sudden cardiac death and hospitalization.    Today, based on physical examination findings, patient is euvolemic. No JVD, lungs are clear to auscultation, no pitting edema in bilateral lower extremities, no ascites.     Dry weight is 215 lbs.     Will increase Entresto to 100 mg po bid.    Will increase Metoprolol SR (Toprol XL) To 50 mg po daily.     Diuretic with furosemide 20 mg 1x daily.     Based on recent data on SGLT2 and heart failure with preserved ejection fraction, patient will be benefited from Jardiance 10 mg p.o. once a day for further reduction in mortality and hospitalization with absolute risk reduction of 3.8%.  Therefore, I will start patient on Jardiance 10 mg p.o. once a day.  Risks and benefits were explained to patient and patient has agreed to proceed.    No indication for ICD with LVEF of 60%.    I spent 5 minutes talking to patient about the danger of smoking. I advised patient and counseled patient on smoking cessation. Patient has promised to achieve goal of zero cigarettes per day.      Complete cessation of ETOH and methamphetamine emphasized.    Coronary arterial disease:  Continue Clopidogrel, asa, BB, ARNI and statin at current dose.    Hypertension:  Optimize control using cardiomyopathy medical regimen as well.    Hyperlipidemia:  Optimize statin as within guidelines of CAD treatment as above.       Of note, during the care of this patient, I spent a significant amount of time explaining the nature of the disease process, reviewing all possible imaging studies, blood test results to patient.  The overall care of this patient require a higher level of care than usual due to the multiple comorbidities along with ongoing issues of congestive heart  failure that put this patient at risk for sudden cardiac death, increased mortality, and hospitalization.  This patient also requires close monitoring with at least monthly blood test to monitor renal function and electrolytes due to the ongoing dynamic changes of medical therapy titration protocol to ensure optimal benefits for the overall care of this patient.         Severino Barajas M.D.

## 2024-03-01 NOTE — PATIENT INSTRUCTIONS
Will increase Metoprolol SR (Toprol XL) To 50 mg po daily.    Will start jardiance 10 mg daily.    Will increase Entresto to 49/51 mg twice a day.

## 2024-03-01 NOTE — TELEPHONE ENCOUNTER
Prior Authorization for Jardiance 10MG tablets has been approved for a quantity of 90 , day supply 90    Prior Authorization reference number: [id number]  Insurance: [employer]rew  Effective dates: 3/1/24-3/1/25  Copay: $n/a ins down     Is patient eligible to fill with [facility]? Yes    Next Steps:  insurance down

## 2024-03-01 NOTE — TELEPHONE ENCOUNTER
Prior Authorization for Jardiance 10MG tablets (Quantity: 90, Days: 90) has been submitted via Cover My Meds: Key ([id number])    Insurance: [employer]UAN [employer]OS[employer]    Will follow up in 24-48 business hours.

## 2024-03-12 ENCOUNTER — OFFICE VISIT (OUTPATIENT)
Dept: CARDIOLOGY | Facility: MEDICAL CENTER | Age: 54
End: 2024-03-12
Attending: INTERNAL MEDICINE
Payer: MEDICAID

## 2024-03-12 DIAGNOSIS — Z71.89 ENCOUNTER FOR EDUCATION ABOUT HEART FAILURE: ICD-10-CM

## 2024-03-12 PROCEDURE — 99999 PR NO CHARGE: CPT | Performed by: INTERNAL MEDICINE

## 2024-03-21 ENCOUNTER — HOSPITAL ENCOUNTER (OUTPATIENT)
Dept: PULMONOLOGY | Facility: MEDICAL CENTER | Age: 54
End: 2024-03-21
Attending: FAMILY MEDICINE
Payer: MEDICAID

## 2024-03-21 DIAGNOSIS — R06.02 SHORTNESS OF BREATH: ICD-10-CM

## 2024-03-21 PROCEDURE — 94060 EVALUATION OF WHEEZING: CPT

## 2024-03-21 PROCEDURE — 94726 PLETHYSMOGRAPHY LUNG VOLUMES: CPT

## 2024-03-21 PROCEDURE — 94729 DIFFUSING CAPACITY: CPT

## 2024-03-21 RX ADMIN — Medication 2.5 MG: at 08:29

## 2024-04-01 ENCOUNTER — APPOINTMENT (OUTPATIENT)
Dept: CARDIOLOGY | Facility: MEDICAL CENTER | Age: 54
End: 2024-04-01
Attending: NURSE PRACTITIONER
Payer: MEDICAID

## 2024-04-06 ENCOUNTER — APPOINTMENT (OUTPATIENT)
Dept: PULMONOLOGY | Facility: MEDICAL CENTER | Age: 54
End: 2024-04-06
Attending: FAMILY MEDICINE
Payer: MEDICAID

## 2024-04-06 PROCEDURE — 94729 DIFFUSING CAPACITY: CPT

## 2024-04-06 PROCEDURE — 94726 PLETHYSMOGRAPHY LUNG VOLUMES: CPT

## 2024-04-06 PROCEDURE — 94060 EVALUATION OF WHEEZING: CPT

## 2024-04-06 RX ADMIN — Medication 2.5 MG: at 09:02

## 2024-04-06 ASSESSMENT — PULMONARY FUNCTION TESTS
FEV1/FVC_PREDICTED: 78
FEV1/FVC_PERCENT_CHANGE: -3
FEV1/FVC_PERCENT_LLN: 66
FVC: 4.51
FEV1_PERCENT_PREDICTED: 103
FEV1/FVC: 78.27
FVC_PERCENT_PREDICTED: 100
FEV1_PERCENT_CHANGE: -3
FEV1_PREDICTED: 3.5
FEV1: 3.53
FVC_LLN: 3.73
FEV1: 3.64
FEV1/FVC_PERCENT_PREDICTED: 99
FEV1/FVC_PERCENT_PREDICTED: 78
FEV1_LLN: 2.92
FEV1_PERCENT_CHANGE: 0
FVC_PREDICTED: 4.47
FVC: 4.51
FEV1/FVC: 81
FEV1/FVC_PERCENT_PREDICTED: 102
FEV1/FVC_PERCENT_PREDICTED: 103
FEV1/FVC_PERCENT_PREDICTED: 100
FVC_LLN: 3.73
FEV1/FVC: 78
FEV1_LLN: 2.92
FEV1/FVC: 81
FEV1/FVC_PERCENT_LLN: 66
FVC_PERCENT_PREDICTED: 100
FEV1_PERCENT_PREDICTED: 100

## 2024-04-08 ENCOUNTER — APPOINTMENT (OUTPATIENT)
Dept: MEDICAL GROUP | Facility: CLINIC | Age: 54
End: 2024-04-08
Payer: MEDICAID

## 2024-04-08 VITALS
TEMPERATURE: 97.5 F | HEART RATE: 95 BPM | SYSTOLIC BLOOD PRESSURE: 130 MMHG | BODY MASS INDEX: 32.74 KG/M2 | HEIGHT: 68 IN | WEIGHT: 216 LBS | OXYGEN SATURATION: 94 % | DIASTOLIC BLOOD PRESSURE: 82 MMHG

## 2024-04-08 DIAGNOSIS — I10 PRIMARY HYPERTENSION: Primary | ICD-10-CM

## 2024-04-08 DIAGNOSIS — R06.02 SHORTNESS OF BREATH: ICD-10-CM

## 2024-04-08 DIAGNOSIS — I50.9 ACC/AHA STAGE C CONGESTIVE HEART FAILURE DUE TO ISCHEMIC CARDIOMYOPATHY (HCC): ICD-10-CM

## 2024-04-08 DIAGNOSIS — I25.5 ACC/AHA STAGE C CONGESTIVE HEART FAILURE DUE TO ISCHEMIC CARDIOMYOPATHY (HCC): ICD-10-CM

## 2024-04-08 PROCEDURE — 99214 OFFICE O/P EST MOD 30 MIN: CPT | Performed by: FAMILY MEDICINE

## 2024-04-08 PROCEDURE — 3075F SYST BP GE 130 - 139MM HG: CPT | Performed by: FAMILY MEDICINE

## 2024-04-08 PROCEDURE — 3079F DIAST BP 80-89 MM HG: CPT | Performed by: FAMILY MEDICINE

## 2024-04-08 ASSESSMENT — FIBROSIS 4 INDEX: FIB4 SCORE: 2.21

## 2024-04-08 NOTE — PROGRESS NOTES
Subjective:     Chief Complaint   Patient presents with    Hypertension Follow-up    Results     Pulmonary function test        HPI: Jimenez is a 54 y.o. male who presents today for the following problems:    Here today for follow-up of hypertension.  Blood pressure is borderline controlled, blood pressures at home are in the 130s to 150 systolic blood pressure.  Denies any chest pain or shortness of breath at this time.  He was seen by cardiology recently. medication changes at that time included increased dose of Entresto, increased dose of metoprolol, addition of Jardiance.  Patient reports that he did not like the way he felt with this medication changes and he does endorse that he had 2 episodes of loss of consciousness.  He is wondering if medication dose alteration can be performed.    Pulmonary function test performed however official results not resulted yet.    Currently taking:  Lasix 20mg Daily  Entresto 24-26mg Daily  Metoprolol- none currently  Jardiance-none currently  Lipitor-not currently taking    Still having dizziness, is wondering if he can return to work ever.  He has not seen ear nose and throat physician yet.      No problems updated.    Current Outpatient Medications   Medication Sig Dispense Refill    furosemide (LASIX) 20 MG Tab Take 1 Tablet by mouth 1 time a day as needed (fluid retention). 30 Tablet 3    potassium chloride ER (KLOR-CON) 10 MEQ tablet Take 1 Tablet by mouth 1 time a day as needed (only on days taking furosemide). 30 Tablet 3    sacubitril-valsartan (ENTRESTO) 49-51 MG Tab Take 1 Tablet by mouth 2 times a day. 60 Tablet 11    meclizine (ANTIVERT) 12.5 MG Tab Take 2 Tablets by mouth 3 times a day as needed for Dizziness. 30 Tablet 2    clopidogrel (PLAVIX) 75 MG Tab Take 75 mg by mouth every day.      atorvastatin (LIPITOR) 40 MG Tab TAKE 1 TABLET BY MOUTH NIGHTLY      aspirin 81 MG EC tablet Take 1 Tablet by mouth every day. 100 Tablet 3    metoprolol SR (TOPROL XL) 50 MG  "TABLET SR 24 HR Take 1 Tablet by mouth every day. 100 Tablet 4    Empagliflozin (JARDIANCE) 10 MG Tab tablet Take 1 Tablet by mouth every day. (Patient not taking: Reported on 4/8/2024) 90 Tablet 4     No current facility-administered medications for this visit.       Social History     Tobacco Use    Smoking status: Every Day     Current packs/day: 0.25     Average packs/day: 0.3 packs/day for 15.0 years (3.8 ttl pk-yrs)     Types: Cigarettes    Smokeless tobacco: Never    Tobacco comments:     3 to 4 cig a day   Vaping Use    Vaping Use: Never used   Substance Use Topics    Alcohol use: Not Currently     Comment: 4 or 5 shots of liquor a day    Drug use: Never        ROS    Objective:     Vitals:    04/08/24 1131   BP: 130/82   BP Location: Left arm   Patient Position: Sitting   BP Cuff Size: Large adult   Pulse: 95   Temp: 36.4 °C (97.5 °F)   TempSrc: Temporal   SpO2: 94%   Weight: 98 kg (216 lb)   Height: 1.727 m (5' 8\")     Body mass index is 32.84 kg/m².     Physical Exam:  Gen: Well developed, well nourished in no acute distress.   Skin: Pink, warm, and dry  HEENT: conjunctiva non-injected  Cardiovascular: Regular rate and rhythm, no murmurs gallops or rubs  Lungs: Clear to auscultation bilaterally, no wheezes rales or rhonchi  Ext: Trace edema to the ankles bilaterally  Alert and oriented Eye contact is good, speech goal directed, affect calm  Gait: not antalgic    Assessment & Plan:   No problem-specific Assessment & Plan notes found for this encounter.    1. Primary hypertension  Hypertension is borderline controlled at this time.  Given that he had such a poor reaction to significant increase in dosage multiple medications, will gradually alter medications.  Will continue below.  Entresto: 24-26mg Daily  Jardiance:10mg daily, start next Monday  Metoprolol: 12.5mg resume tonight with Lipitor  Lasix: continue 20mg daily    2. ACC/AHA stage C congestive heart failure due to ischemic cardiomyopathy " ([facility])  Will continue with Entresto, metoprolol, and will manage hypertension as above.  Additional cardiology referral placed so the patient may have a second opinion.  - REFERRAL TO CARDIOLOGY    3. Shortness of breath  Will await pulmonary function test results.    Will continue clopidogrel (Plavix) as per cardiology recommendations.    Discussed with patient that dizziness needs to be assessed by ear nose and throat physician so that we can determine long-term status for return to work.    Patient verbalizes understanding and agreement with assessment and plan.    Followup: Return in about 2 weeks (around 4/22/2024), or if symptoms worsen or fail to improve.    Theodore Calvo M.D.    Please note that this dictation was created using voice recognition software. I have made every reasonable attempt to correct obvious errors, but I expect that there are errors of grammar and possibly content that I did not discover before finalizing the note.

## 2024-04-10 NOTE — PROCEDURES
DATE OF SERVICE:  04/06/2024     PULMONARY FUNCTION TEST INTERPRETATION     REFERRING PROVIDER:  Theodore Calvo MD     INTERPRETING PROVIDER:  Ronnie Jaffe III, MD     INTERPRETATION:  1.  There is no significant obstructive ventilatory defect on spirometry.    There is no significant response to bronchodilators.  2.  Lung volumes are normal.  3.  Diffusion capacity is similarly normal.  4.  Flow volume loop is consistent with the above interpretation.  5.  No prior PFTs for comparison.        ______________________________  Ronnie Jaffe III, MD    [facility] [place]    DD:  04/09/2024 18:03  DT:  04/09/2024 18:13    Job#:  [id number]

## 2024-04-18 DIAGNOSIS — I25.5 ACC/AHA STAGE C CONGESTIVE HEART FAILURE DUE TO ISCHEMIC CARDIOMYOPATHY (HCC): ICD-10-CM

## 2024-04-18 DIAGNOSIS — I50.9 ACC/AHA STAGE C CONGESTIVE HEART FAILURE DUE TO ISCHEMIC CARDIOMYOPATHY (HCC): ICD-10-CM

## 2024-04-19 ENCOUNTER — OFFICE VISIT (OUTPATIENT)
Dept: MEDICAL GROUP | Facility: CLINIC | Age: 54
End: 2024-04-19
Payer: MEDICAID

## 2024-04-19 VITALS
WEIGHT: 218 LBS | SYSTOLIC BLOOD PRESSURE: 142 MMHG | OXYGEN SATURATION: 93 % | HEIGHT: 68 IN | RESPIRATION RATE: 20 BRPM | HEART RATE: 94 BPM | BODY MASS INDEX: 33.04 KG/M2 | TEMPERATURE: 97.1 F | DIASTOLIC BLOOD PRESSURE: 102 MMHG

## 2024-04-19 DIAGNOSIS — R42 LIGHT HEADEDNESS: ICD-10-CM

## 2024-04-19 DIAGNOSIS — R05.1 ACUTE COUGH: Primary | ICD-10-CM

## 2024-04-19 DIAGNOSIS — R55 SYNCOPE, UNSPECIFIED SYNCOPE TYPE: ICD-10-CM

## 2024-04-19 DIAGNOSIS — I50.9 ACC/AHA STAGE C CONGESTIVE HEART FAILURE DUE TO ISCHEMIC CARDIOMYOPATHY (HCC): ICD-10-CM

## 2024-04-19 DIAGNOSIS — I10 PRIMARY HYPERTENSION: ICD-10-CM

## 2024-04-19 DIAGNOSIS — I25.5 ACC/AHA STAGE C CONGESTIVE HEART FAILURE DUE TO ISCHEMIC CARDIOMYOPATHY (HCC): ICD-10-CM

## 2024-04-19 PROCEDURE — 3080F DIAST BP >= 90 MM HG: CPT | Performed by: FAMILY MEDICINE

## 2024-04-19 PROCEDURE — 3077F SYST BP >= 140 MM HG: CPT | Performed by: FAMILY MEDICINE

## 2024-04-19 PROCEDURE — 99214 OFFICE O/P EST MOD 30 MIN: CPT | Performed by: FAMILY MEDICINE

## 2024-04-19 RX ORDER — BENZONATATE 100 MG/1
100 CAPSULE ORAL 3 TIMES DAILY PRN
Qty: 60 CAPSULE | Refills: 0 | Status: SHIPPED | OUTPATIENT
Start: 2024-04-19

## 2024-04-19 RX ORDER — DOXYCYCLINE HYCLATE 100 MG
100 TABLET ORAL 2 TIMES DAILY
Qty: 10 TABLET | Refills: 0 | Status: SHIPPED | OUTPATIENT
Start: 2024-04-19 | End: 2024-04-24

## 2024-04-19 RX ORDER — FUROSEMIDE 20 MG/1
TABLET ORAL
Qty: 30 TABLET | Refills: 0 | Status: SHIPPED | OUTPATIENT
Start: 2024-04-19

## 2024-04-19 ASSESSMENT — FIBROSIS 4 INDEX: FIB4 SCORE: 2.21

## 2024-04-19 NOTE — PROGRESS NOTES
Subjective:     Chief Complaint   Patient presents with    Cough     Dizzy with cough     Shortness of Breath       HPI: Jimenez is a 54 y.o. male who presents today for the following problems:    Patient here today to follow-up regarding blood pressure as well as previous lab findings.    He also had a viral URI recently about a week previously.  He is still having cough and some slight dyspnea.    He ran out of his Lasix however this was refilled and he has it back today.  His weight is slightly over his previous weight in our clinic.    He still experiencing dizziness with walking.  He is determined now to go to the ear nose and throat physician to have this evaluated.    He is also gotten appointment for second opinion from a different cardiologist.  Given his symptoms last week he did not reinitiate metoprolol regularly.  However he was taking his Entresto.  Blood pressure is a little higher today than usual.    He did have pulmonary function tests performed which did not find any evidence of significant obstructive lung disease.    Of note, he also had carotid artery ultrasounds performed which demonstrated findings inconsistent with significant stenosis.    Laterality:    Bilateral    Patient Location: CVU    Indications  52 YO male with syncope with collpase on 7/11/23. History of hypertension, heavy alcohol consumption, tobacco use. Patient states has has unequal blood pressures for last month, but more equal in last few days.    Findings  Right:  1. The common carotid has mild intimal wall thickening at mid and distal segments.  2. The external carotid has mild intimal wall thickening, proximally.  3. The internal carotid has mild, < 50% heterogenous plaque, proximally.  4. The vertebral is normal with antegrade flow.  5. The subclavian is normal.    Left:  1. The common carotid has mild intimal wall thickening at mid and distal segments.  2. The external carotid is normal.  3. The internal carotid is  normal.  4. The vertebral is normal with antegrade flow.  5. The subclavian is normal.    Conclusion  1. Mild, < 50% heterogenous plaque of proximal right internal carotid artery.  2. Mild initmal wall thickening of bilateral mid and distal common carotid arteries.  3. Mild intimal wall thickening of proximal right external carotid.  4. Normal bilateral vertebral and subclavian arteries.      No problems updated.    Current Outpatient Medications   Medication Sig Dispense Refill    furosemide (LASIX) 20 MG Tab TAKE 1 TABLET BY MOUTH ONE TIME  AS NEEDED (FLUID RETENTION) 30 Tablet 0    benzonatate (TESSALON) 100 MG Cap Take 1 Capsule by mouth 3 times a day as needed for Cough. 60 Capsule 0    doxycycline (VIBRAMYCIN) 100 MG Tab Take 1 Tablet by mouth 2 times a day for 5 days. 10 Tablet 0    potassium chloride ER (KLOR-CON) 10 MEQ tablet Take 1 Tablet by mouth 1 time a day as needed (only on days taking furosemide). 30 Tablet 3    sacubitril-valsartan (ENTRESTO) 49-51 MG Tab Take 1 Tablet by mouth 2 times a day. 60 Tablet 11    meclizine (ANTIVERT) 12.5 MG Tab Take 2 Tablets by mouth 3 times a day as needed for Dizziness. 30 Tablet 2    clopidogrel (PLAVIX) 75 MG Tab Take 75 mg by mouth every day.      atorvastatin (LIPITOR) 40 MG Tab TAKE 1 TABLET BY MOUTH NIGHTLY      aspirin 81 MG EC tablet Take 1 Tablet by mouth every day. 100 Tablet 3    metoprolol SR (TOPROL XL) 50 MG TABLET SR 24 HR Take 1 Tablet by mouth every day. (Patient not taking: Reported on 4/19/2024) 100 Tablet 4    Empagliflozin (JARDIANCE) 10 MG Tab tablet Take 1 Tablet by mouth every day. (Patient not taking: Reported on 4/8/2024) 90 Tablet 4     No current facility-administered medications for this visit.       Social History     Tobacco Use    Smoking status: Every Day     Current packs/day: 0.25     Average packs/day: 0.3 packs/day for 15.0 years (3.8 ttl pk-yrs)     Types: Cigarettes    Smokeless tobacco: Never    Tobacco comments:     3 to 4  "cig a day   Vaping Use    Vaping Use: Never used   Substance Use Topics    Alcohol use: Not Currently     Comment: 4 or 5 shots of liquor a day    Drug use: Never        ROS    Objective:     Vitals:    04/19/24 1137   BP: (!) 142/102   BP Location: Left arm   Patient Position: Sitting   BP Cuff Size: Adult   Pulse: 94   Resp: 20   Temp: 36.2 °C (97.1 °F)   TempSrc: Temporal   SpO2: 93%   Weight: 98.9 kg (218 lb)   Height: 1.727 m (5' 8\")     Body mass index is 33.15 kg/m².     Physical Exam:  Gen: Well developed, well nourished in no acute distress.   Skin: Pink, warm, and dry  HEENT: conjunctiva non-injected  Cardiovascular: Regular rate and rhythm, no murmurs gallops or rubs  Lungs: Clear to auscultation bilaterally, no wheezes rales or rhonchi  Alert and oriented Eye contact is good, speech goal directed, affect calm  Gait: not antalgic    Assessment & Plan:   No problem-specific Assessment & Plan notes found for this encounter.    1. Acute cough  Patient's symptoms are likely secondary to a bronchitis.  Will go ahead and treat with doxycycline which will have the anti-inflammatory effect as well as antibiotic effect should there be any concomitant pneumonia.  - benzonatate (TESSALON) 100 MG Cap; Take 1 Capsule by mouth 3 times a day as needed for Cough.  Dispense: 60 Capsule; Refill: 0  - doxycycline (VIBRAMYCIN) 100 MG Tab; Take 1 Tablet by mouth 2 times a day for 5 days.  Dispense: 10 Tablet; Refill: 0    2. Syncope, unspecified syncope type  3. Light headedness  After extensive discussion with patient he determine results to be evaluated by ear nose and throat so that he can get to the bottom of his dizziness.  Referral placed once again so that he can attempt to get an ear nose and throat physician in [place] rather than having to travel to [place].  - Referral to ENT    4. ACC/AHA stage C congestive heart failure due to ischemic cardiomyopathy (HCC)  He will restart his metoprolol today.  He will also " resume his Lasix.  Will follow-up in about 2 weeks.    5. Primary hypertension  Continue Entresto and restart medicines as above.  Will follow-up in about 2 weeks.    Mr. Gandara verbalizes understanding and agreement with assessment and plan.    Followup: Return in about 2 weeks (around 5/3/2024), or if symptoms worsen or fail to improve.    Theodore Calvo M.D.    Please note that this dictation was created using voice recognition software. I have made every reasonable attempt to correct obvious errors, but I expect that there are errors of grammar and possibly content that I did not discover before finalizing the note.

## 2024-06-07 ENCOUNTER — HOSPITAL ENCOUNTER (OUTPATIENT)
Dept: LAB | Facility: MEDICAL CENTER | Age: 54
End: 2024-06-07
Attending: ANESTHESIOLOGY
Payer: MEDICAID

## 2024-06-07 LAB
ALBUMIN SERPL BCP-MCNC: 4 G/DL (ref 3.2–4.9)
ALBUMIN/GLOB SERPL: 1.5 G/DL
ALP SERPL-CCNC: 107 U/L (ref 30–99)
ALT SERPL-CCNC: 41 U/L (ref 2–50)
ANION GAP SERPL CALC-SCNC: 13 MMOL/L (ref 7–16)
AST SERPL-CCNC: 47 U/L (ref 12–45)
BILIRUB SERPL-MCNC: 1 MG/DL (ref 0.1–1.5)
BUN SERPL-MCNC: 25 MG/DL (ref 8–22)
CALCIUM ALBUM COR SERPL-MCNC: 9.5 MG/DL (ref 8.5–10.5)
CALCIUM SERPL-MCNC: 9.5 MG/DL (ref 8.5–10.5)
CHLORIDE SERPL-SCNC: 102 MMOL/L (ref 96–112)
CO2 SERPL-SCNC: 24 MMOL/L (ref 20–33)
CREAT SERPL-MCNC: 1.77 MG/DL (ref 0.5–1.4)
GFR SERPLBLD CREATININE-BSD FMLA CKD-EPI: 44 ML/MIN/1.73 M 2
GLOBULIN SER CALC-MCNC: 2.7 G/DL (ref 1.9–3.5)
GLUCOSE SERPL-MCNC: 176 MG/DL (ref 65–99)
POTASSIUM SERPL-SCNC: 4.1 MMOL/L (ref 3.6–5.5)
PROT SERPL-MCNC: 6.7 G/DL (ref 6–8.2)
SODIUM SERPL-SCNC: 139 MMOL/L (ref 135–145)

## 2024-06-07 PROCEDURE — 36415 COLL VENOUS BLD VENIPUNCTURE: CPT

## 2024-06-07 PROCEDURE — 80053 COMPREHEN METABOLIC PANEL: CPT

## 2024-07-05 NOTE — PROGRESS NOTES
Could you ask his clinic if there are any other providers willing to do allergy testing in pregnancy for PCN?  If no please have them ask Dr. Gooden if he is willing to fit her into the schedule and give them her due date.  Thanks,  Natalie   Subjective:     Chief Complaint   Patient presents with    Ringing in Ear     Pt c/o ringing in both of his ears and also dizziness       HPI: Jimenez is a 53 y.o. male who presents today for the following problems:  Doing well today, however, he is having persistent dizziness symptoms.  Dizziness is not constant, however it is most persistent with rising from a lying down position.  It lasts a few seconds and then disappears.  Appears to be largely positional.    His weight has been creeping up a little bit.  At last office visit in December, weight was 208 pounds.   appears to be his comfortable zone regarding his heart failure management.  Denies very mild orthopnea at this time, but barely noticeable.  Also endorses that he is wearing his compression socks at this time.  Breakfast: Cereal and mlk usually  Lunch: [place] or salad  Dinner: spaghetti last night and garlic bread.    Avoiding seasoning    No problems updated.    Current Outpatient Medications   Medication Sig Dispense Refill    meclizine (ANTIVERT) 12.5 MG Tab Take 2 Tablets by mouth 3 times a day as needed for Dizziness. 30 Tablet 2    sacubitril-valsartan (ENTRESTO) 24-26 MG Tab Take 1 Tablet by mouth 2 times a day. 60 Tablet 1    clopidogrel (PLAVIX) 75 MG Tab Take 75 mg by mouth every day.      atorvastatin (LIPITOR) 40 MG Tab TAKE 1 TABLET BY MOUTH NIGHTLY      furosemide (LASIX) 20 MG Tab Take 1 Tablet by mouth 1 time a day as needed (fluid retention). 30 Tablet 3    potassium chloride ER (KLOR-CON) 10 MEQ tablet Take 1 Tablet by mouth 1 time a day as needed (only on days taking furosemide). 30 Tablet 3    metoprolol SR (TOPROL XL) 25 MG TABLET SR 24 HR Take 0.5 Tablets by mouth every day. 50 Tablet 3    aspirin 81 MG EC tablet Take 1 Tablet by mouth every day. 100 Tablet 3     No current facility-administered medications for this visit.       Social History     Tobacco Use    Smoking status: Every Day     Current packs/day: 0.25      "Average packs/day: 0.3 packs/day for 15.0 years (3.8 ttl pk-yrs)     Types: Cigarettes    Smokeless tobacco: Never    Tobacco comments:     3 to 4 cig a day   Vaping Use    Vaping Use: Never used   Substance Use Topics    Alcohol use: Not Currently     Comment: 4 or 5 shots of liquor a day    Drug use: Never        Review of Systems   Respiratory:  Positive for shortness of breath.    Cardiovascular:  Negative for chest pain.   Neurological:  Positive for dizziness.       Objective:     Vitals:    01/10/24 1311   BP: 104/76   BP Location: Left arm   Patient Position: Sitting   BP Cuff Size: Adult   Pulse: 98   Resp: 16   Temp: 36.4 °C (97.5 °F)   TempSrc: Temporal   SpO2: 93%   Weight: 98.4 kg (217 lb)   Height: 1.727 m (5' 8\")     Body mass index is 32.99 kg/m².     Physical Exam:  Gen: Well developed, well nourished in no acute distress.   Skin: Pink, warm, and dry  HEENT: conjunctiva non-injected  Cardiovascular: Regular rate and rhythm, no murmurs gallops or rubs  Lungs: Clear to auscultation bilaterally, no wheezes rales or rhonchi  Alert and oriented Eye contact is good, speech goal directed, affect calm  Gait: not antalgic    Assessment & Plan:   No problem-specific Assessment & Plan notes found for this encounter.  1. Dizziness  2. Light headedness  3. Normal hearing exam  It is possible that there is a component of BPPV with this patient.  Since Epley maneuvers helped initially, will trial course of meclizine at home as well as scheduled Epley maneuvers 4 times a day.  Meclizine will be used daily and before Epley maneuvers.  Reviewed Epley's maneuvers during office visit today.  If no improvement, will consider neurology referral to see if there is anything that can be done from a neurology standpoint before patient makes a long trip to [place].  Follow-up in 1 week.  - Referral to Audiology  - meclizine (ANTIVERT) 12.5 MG Tab; Take 2 Tablets by mouth 3 times a day as needed for Dizziness.  Dispense: 30 " Tablet; Refill: 2  - Referral to Audiology    4. Chronic diastolic heart failure (HCC)  Patient does seem to have elevated weight today compared to normal.  Will do an additional day of furosemide and he will reevaluate his symptoms at home.  If there is improvement and his weight is within normal limits, 205 to 208 pounds that he can continue normal diet and discontinue Lasix, however his his weight is persistent leak over 208 to 209 tomorrow, and he still has mild orthopnea, will administer 1 additional day of Lasix.  Will follow-up in 5 days.    Patient verbalizes understanding and agreement with assessment and plan.    Followup: Return in about 5 days (around 1/15/2024), or if symptoms worsen or fail to improve.    Theodore Calvo M.D.    Please note that this dictation was created using voice recognition software. I have made every reasonable attempt to correct obvious errors, but I expect that there are errors of grammar and possibly content that I did not discover before finalizing the note.

## 2024-09-26 DIAGNOSIS — I25.5 ACC/AHA STAGE C CONGESTIVE HEART FAILURE DUE TO ISCHEMIC CARDIOMYOPATHY (HCC): ICD-10-CM

## 2024-09-26 DIAGNOSIS — I50.9 ACC/AHA STAGE C CONGESTIVE HEART FAILURE DUE TO ISCHEMIC CARDIOMYOPATHY (HCC): ICD-10-CM

## 2024-09-26 RX ORDER — FUROSEMIDE 20 MG
TABLET ORAL
Qty: 30 TABLET | Refills: 0 | Status: SHIPPED | OUTPATIENT
Start: 2024-09-26

## 2024-10-27 DIAGNOSIS — I25.5 ACC/AHA STAGE C CONGESTIVE HEART FAILURE DUE TO ISCHEMIC CARDIOMYOPATHY (HCC): ICD-10-CM

## 2024-10-27 DIAGNOSIS — I50.9 ACC/AHA STAGE C CONGESTIVE HEART FAILURE DUE TO ISCHEMIC CARDIOMYOPATHY (HCC): ICD-10-CM

## 2024-10-29 RX ORDER — FUROSEMIDE 20 MG/1
TABLET ORAL
Qty: 30 TABLET | Refills: 0 | Status: SHIPPED | OUTPATIENT
Start: 2024-10-29

## 2024-11-26 DIAGNOSIS — I50.9 ACC/AHA STAGE C CONGESTIVE HEART FAILURE DUE TO ISCHEMIC CARDIOMYOPATHY (HCC): ICD-10-CM

## 2024-11-26 DIAGNOSIS — I25.5 ACC/AHA STAGE C CONGESTIVE HEART FAILURE DUE TO ISCHEMIC CARDIOMYOPATHY (HCC): ICD-10-CM

## 2024-11-26 DIAGNOSIS — I10 HYPERTENSION, UNSPECIFIED TYPE: ICD-10-CM

## 2024-11-26 RX ORDER — FUROSEMIDE 20 MG/1
TABLET ORAL
Qty: 30 TABLET | Refills: 0 | Status: SHIPPED | OUTPATIENT
Start: 2024-11-26

## 2024-12-21 DIAGNOSIS — I50.9 ACC/AHA STAGE C CONGESTIVE HEART FAILURE DUE TO ISCHEMIC CARDIOMYOPATHY (HCC): ICD-10-CM

## 2024-12-21 DIAGNOSIS — I25.5 ACC/AHA STAGE C CONGESTIVE HEART FAILURE DUE TO ISCHEMIC CARDIOMYOPATHY (HCC): ICD-10-CM

## 2024-12-23 RX ORDER — FUROSEMIDE 20 MG/1
TABLET ORAL
Qty: 90 TABLET | Refills: 0 | Status: SHIPPED | OUTPATIENT
Start: 2024-12-23

## 2025-02-24 ENCOUNTER — APPOINTMENT (OUTPATIENT)
Dept: MEDICAL GROUP | Facility: CLINIC | Age: 55
End: 2025-02-24
Payer: MEDICAID

## 2025-03-07 ENCOUNTER — APPOINTMENT (OUTPATIENT)
Dept: MEDICAL GROUP | Facility: CLINIC | Age: 55
End: 2025-03-07
Payer: MEDICAID

## 2025-03-07 VITALS
DIASTOLIC BLOOD PRESSURE: 86 MMHG | SYSTOLIC BLOOD PRESSURE: 127 MMHG | HEIGHT: 68 IN | TEMPERATURE: 96.6 F | WEIGHT: 218 LBS | OXYGEN SATURATION: 91 % | HEART RATE: 81 BPM | BODY MASS INDEX: 33.04 KG/M2 | RESPIRATION RATE: 16 BRPM

## 2025-03-07 DIAGNOSIS — R19.7 DIARRHEA, UNSPECIFIED TYPE: ICD-10-CM

## 2025-03-07 DIAGNOSIS — Z12.12 SCREENING FOR COLORECTAL CANCER: ICD-10-CM

## 2025-03-07 DIAGNOSIS — R06.02 SOB (SHORTNESS OF BREATH): ICD-10-CM

## 2025-03-07 DIAGNOSIS — R09.81 SINUS CONGESTION: ICD-10-CM

## 2025-03-07 DIAGNOSIS — Z12.11 SCREENING FOR COLORECTAL CANCER: ICD-10-CM

## 2025-03-07 DIAGNOSIS — I50.22 CHRONIC SYSTOLIC HEART FAILURE (HCC): ICD-10-CM

## 2025-03-07 PROCEDURE — 3074F SYST BP LT 130 MM HG: CPT | Performed by: STUDENT IN AN ORGANIZED HEALTH CARE EDUCATION/TRAINING PROGRAM

## 2025-03-07 PROCEDURE — 3079F DIAST BP 80-89 MM HG: CPT | Performed by: STUDENT IN AN ORGANIZED HEALTH CARE EDUCATION/TRAINING PROGRAM

## 2025-03-07 PROCEDURE — 99215 OFFICE O/P EST HI 40 MIN: CPT | Performed by: STUDENT IN AN ORGANIZED HEALTH CARE EDUCATION/TRAINING PROGRAM

## 2025-03-07 RX ORDER — POTASSIUM CHLORIDE 750 MG/1
1 TABLET, EXTENDED RELEASE ORAL 2 TIMES DAILY
COMMUNITY
Start: 2024-09-09 | End: 2025-03-07

## 2025-03-07 RX ORDER — METOPROLOL SUCCINATE 50 MG/1
1 TABLET, EXTENDED RELEASE ORAL DAILY
COMMUNITY

## 2025-03-07 RX ORDER — DOXYCYCLINE HYCLATE 100 MG
TABLET ORAL
COMMUNITY

## 2025-03-07 RX ORDER — LOSARTAN POTASSIUM 50 MG/1
1 TABLET ORAL DAILY
COMMUNITY

## 2025-03-07 RX ORDER — MACITENTAN AND TADALAFIL 10; 40 MG/1; MG/1
TABLET, FILM COATED ORAL
COMMUNITY
Start: 2025-03-05

## 2025-03-07 RX ORDER — CARVEDILOL 12.5 MG/1
1 TABLET ORAL 2 TIMES DAILY
COMMUNITY

## 2025-03-07 RX ORDER — CARVEDILOL 3.12 MG/1
1 TABLET ORAL 2 TIMES DAILY
COMMUNITY

## 2025-03-07 RX ORDER — EMPAGLIFLOZIN 10 MG/1
1 TABLET, FILM COATED ORAL DAILY
COMMUNITY

## 2025-03-07 RX ORDER — METOPROLOL SUCCINATE 25 MG/1
0.5 TABLET, EXTENDED RELEASE ORAL DAILY
COMMUNITY

## 2025-03-07 RX ORDER — NEOMYCIN/POLYMYXIN B/HYDROCORT 3.5-10K-1
SUSPENSION, DROPS(FINAL DOSAGE FORM)(ML) OPHTHALMIC (EYE)
COMMUNITY
End: 2025-03-07

## 2025-03-07 RX ORDER — TRAMADOL HYDROCHLORIDE 50 MG/1
TABLET ORAL
COMMUNITY
End: 2025-03-07

## 2025-03-07 RX ORDER — CARVEDILOL 6.25 MG/1
1 TABLET ORAL DAILY
COMMUNITY

## 2025-03-07 RX ORDER — POTASSIUM CHLORIDE 1500 MG/1
20 TABLET, EXTENDED RELEASE ORAL DAILY
COMMUNITY
Start: 2025-02-09

## 2025-03-07 RX ORDER — BENZONATATE 100 MG/1
1 CAPSULE ORAL 3 TIMES DAILY PRN
COMMUNITY
End: 2025-03-07

## 2025-03-07 RX ORDER — CLOPIDOGREL BISULFATE 75 MG/1
1 TABLET ORAL DAILY
COMMUNITY

## 2025-03-07 ASSESSMENT — FIBROSIS 4 INDEX: FIB4 SCORE: 2.36

## 2025-03-07 NOTE — PROGRESS NOTES
Family Medicine Clinic Note    Date: 3/7/2025    PPE used by provider during encounter: surgical mask    ASSESSMENT & PLAN    1. SOB (shortness of breath)  2. Chronic systolic heart failure (HCC)  Worsening sob with increase weight gain and leg swelling. May have had recent viral infection. Lungs clear. Will double lasix for next couple of days and get additional labs/studies as above. Has cardiology follow up next week. Not on statin or beta blocker - will have confirm with cardiolgoy at follow up visit next week. O2 sat with drop from 97% to 92% however also recording 92-94% on myself and pt wife. Pt did not want to wait to get alternative pulse ox.    - CBC WITH DIFFERENTIAL; Future  - Comp Metabolic Panel; Future  - proBrain Natriuretic Peptide, NT; Future  - EC-ECHOCARDIOGRAM COMPLETE W/O CONT; Future  - Lipid Profile; Future    3. Diarrhea, unspecified type  Chronic in nature, never had colonoscopy. Will start workup and refer.  - Referral to Gastroenterology  - CALPROTECTIN,FECAL; Future  - TSH WITH REFLEX TO FT4; Future    4. Screening for colorectal cancer  - Referral to Gastroenterology    5. Sinus congestion  Likely from viral infection but been present for last week. No sinus tenderness currently but associated with green discharge and sputum but no cough. If worsening and not improving over next week can send [facility] message and will start abx.     Follow up 4 weeks or earlier if needed    SUBJECTIVE    Chief Complaint   Patient presents with    [facility]     [facility] pcp, natan        Jimenez is a 55 y.o. person presenting today with the following concerns:    Heart failure, hrn, pah    Not feeling well for last week or so  Congestion, spitting up mucus, cough  Green diarrhea some of the time, feeling bloated  - having to go immediately after meal for last 2-3 months diarrhea has been present for longer  Some swelling in legs  Is having more difficult with breathing  Some sinus pressure/pain  No  "fevers/chills      Usually 206-210, 218 on office scale today    Down to 4-5 cigarettes daily  Was pack a day for 20+ years      Had echo in last year?    OBJECTIVE  /86 (BP Location: Left arm, Patient Position: Sitting, BP Cuff Size: Adult) Comment: patient smoke about 15- 20min ago before coming in.  Pulse 81   Temp 35.9 °C (96.6 °F) (Temporal)   Resp 16   Ht 1.727 m (5' 8\")   Wt 98.9 kg (218 lb)   SpO2 91% Comment: patient O2 drop from 97% to 91%  BMI 33.15 kg/m²      General: Well appearing, NAD  HEENT: TM normal, no sinus pressure, + congestion  Lungs: breathing comfortably, cab good air movement, no crackles or wheezing  Ext: 2+ pitting edema bilaterallly  Neuro: 2-12 grossly intact    Total time I spent in care of this patient today (excluding time spent on other billable services) was 40 minutes.    Sylvester Hicks MD   Family and Community Medicine  [facility]  [facility]    "

## 2025-03-13 ENCOUNTER — PATIENT MESSAGE (OUTPATIENT)
Dept: MEDICAL GROUP | Facility: CLINIC | Age: 55
End: 2025-03-13
Payer: MEDICAID

## 2025-03-13 NOTE — Clinical Note
REFERRAL APPROVAL NOTICE         Sent on March 13, 2025                   Jimenez Gandara  [address] [place] [address]                   Dear Mr. Gandara,    After a careful review of the medical information and benefit coverage, [facility] has processed your referral. See below for additional details.    If applicable, you must be actively enrolled with your insurance for coverage of the authorized service. If you have any questions regarding your coverage, please contact your insurance directly.    REFERRAL INFORMATION   Referral #:  [id number]  Referred-To Provider    Referred-By Provider:  Gastroenterology    Sylvester Hicks M.D.   [facility]      [address] [facility] [place] [address]  [phone] [address] [place] [address]  [phone]    Referral Start Date:  03/07/2025  Referral End Date:   03/07/2026             SCHEDULING  If you do not already have an appointment, please call [phone] to make an appointment.     MORE INFORMATION  If you do not already have a [facility] account, sign up at: [email or web address]  You can access your medical information, make appointments, see lab results, billing information, and more.  If you have questions regarding this referral, please contact  the [facility] Referrals department at:             [phone]. Monday - Friday 8:00AM - 5:00PM.     Sincerely,    [facility]

## 2025-03-21 DIAGNOSIS — I25.10 CORONARY ARTERY DISEASE DUE TO LIPID RICH PLAQUE: ICD-10-CM

## 2025-03-21 DIAGNOSIS — I25.83 CORONARY ARTERY DISEASE DUE TO LIPID RICH PLAQUE: ICD-10-CM

## 2025-04-11 DIAGNOSIS — I50.9 ACC/AHA STAGE C CONGESTIVE HEART FAILURE DUE TO ISCHEMIC CARDIOMYOPATHY (HCC): ICD-10-CM

## 2025-04-11 DIAGNOSIS — I25.5 ACC/AHA STAGE C CONGESTIVE HEART FAILURE DUE TO ISCHEMIC CARDIOMYOPATHY (HCC): ICD-10-CM

## 2025-04-11 RX ORDER — FUROSEMIDE 20 MG/1
TABLET ORAL
Qty: 90 TABLET | Refills: 0 | OUTPATIENT
Start: 2025-04-11

## 2025-04-11 NOTE — TELEPHONE ENCOUNTER
Nandini Khalil to Me (Selected Message)  CG    4/11/25 11:21 AM  Per notes from last cancelled appt, pt now sees a different cardiologist. Thanks!

## 2025-04-23 DIAGNOSIS — I50.9 ACC/AHA STAGE C CONGESTIVE HEART FAILURE DUE TO ISCHEMIC CARDIOMYOPATHY (HCC): ICD-10-CM

## 2025-04-23 DIAGNOSIS — I25.5 ACC/AHA STAGE C CONGESTIVE HEART FAILURE DUE TO ISCHEMIC CARDIOMYOPATHY (HCC): ICD-10-CM

## 2025-04-25 RX ORDER — FUROSEMIDE 20 MG/1
TABLET ORAL
Qty: 90 TABLET | Refills: 0 | Status: SHIPPED | OUTPATIENT
Start: 2025-04-25

## 2025-05-24 ENCOUNTER — HOSPITAL ENCOUNTER (OUTPATIENT)
Dept: LAB | Facility: MEDICAL CENTER | Age: 55
End: 2025-05-24
Attending: INTERNAL MEDICINE
Payer: MEDICAID

## 2025-05-24 LAB
ERYTHROCYTE [DISTWIDTH] IN BLOOD BY AUTOMATED COUNT: 46.3 FL (ref 35.9–50)
HCT VFR BLD AUTO: 50.7 % (ref 42–52)
HGB BLD-MCNC: 17 G/DL (ref 14–18)
MCH RBC QN AUTO: 30.4 PG (ref 27–33)
MCHC RBC AUTO-ENTMCNC: 33.5 G/DL (ref 32.3–36.5)
MCV RBC AUTO: 90.5 FL (ref 81.4–97.8)
PLATELET # BLD AUTO: 223 K/UL (ref 164–446)
PMV BLD AUTO: 11.3 FL (ref 9–12.9)
RBC # BLD AUTO: 5.6 M/UL (ref 4.7–6.1)
WBC # BLD AUTO: 7.6 K/UL (ref 4.8–10.8)

## 2025-05-24 PROCEDURE — 36415 COLL VENOUS BLD VENIPUNCTURE: CPT

## 2025-05-24 PROCEDURE — 85027 COMPLETE CBC AUTOMATED: CPT

## 2025-06-14 ENCOUNTER — HOSPITAL ENCOUNTER (OUTPATIENT)
Dept: LAB | Facility: MEDICAL CENTER | Age: 55
End: 2025-06-14
Attending: INTERNAL MEDICINE
Payer: MEDICAID

## 2025-06-14 LAB
ERYTHROCYTE [DISTWIDTH] IN BLOOD BY AUTOMATED COUNT: 43.2 FL (ref 35.9–50)
HCT VFR BLD AUTO: 54.3 % (ref 42–52)
HGB BLD-MCNC: 18.5 G/DL (ref 14–18)
MCH RBC QN AUTO: 30 PG (ref 27–33)
MCHC RBC AUTO-ENTMCNC: 34.1 G/DL (ref 32.3–36.5)
MCV RBC AUTO: 88 FL (ref 81.4–97.8)
PLATELET # BLD AUTO: 242 K/UL (ref 164–446)
PMV BLD AUTO: 11.4 FL (ref 9–12.9)
RBC # BLD AUTO: 6.17 M/UL (ref 4.7–6.1)
WBC # BLD AUTO: 9.3 K/UL (ref 4.8–10.8)

## 2025-06-14 PROCEDURE — 85027 COMPLETE CBC AUTOMATED: CPT

## 2025-06-14 PROCEDURE — 36415 COLL VENOUS BLD VENIPUNCTURE: CPT

## 2025-07-14 ENCOUNTER — HOSPITAL ENCOUNTER (OUTPATIENT)
Dept: LAB | Facility: MEDICAL CENTER | Age: 55
End: 2025-07-14
Attending: INTERNAL MEDICINE
Payer: MEDICAID

## 2025-07-14 LAB
ERYTHROCYTE [DISTWIDTH] IN BLOOD BY AUTOMATED COUNT: 43.4 FL (ref 35.9–50)
HCT VFR BLD AUTO: 55.5 % (ref 42–52)
HGB BLD-MCNC: 19.1 G/DL (ref 14–18)
MCH RBC QN AUTO: 29.7 PG (ref 27–33)
MCHC RBC AUTO-ENTMCNC: 34.4 G/DL (ref 32.3–36.5)
MCV RBC AUTO: 86.2 FL (ref 81.4–97.8)
PLATELET # BLD AUTO: 243 K/UL (ref 164–446)
PMV BLD AUTO: 11.1 FL (ref 9–12.9)
RBC # BLD AUTO: 6.44 M/UL (ref 4.7–6.1)
WBC # BLD AUTO: 9.2 K/UL (ref 4.8–10.8)

## 2025-07-14 PROCEDURE — 36415 COLL VENOUS BLD VENIPUNCTURE: CPT

## 2025-07-14 PROCEDURE — 85027 COMPLETE CBC AUTOMATED: CPT

## 2025-08-04 ENCOUNTER — HOSPITAL ENCOUNTER (OUTPATIENT)
Dept: LAB | Facility: MEDICAL CENTER | Age: 55
End: 2025-08-04
Attending: INTERNAL MEDICINE
Payer: MEDICAID

## 2025-08-04 LAB
ERYTHROCYTE [DISTWIDTH] IN BLOOD BY AUTOMATED COUNT: 46.4 FL (ref 35.9–50)
HCT VFR BLD AUTO: 55.4 % (ref 42–52)
HGB BLD-MCNC: 18.4 G/DL (ref 14–18)
MCH RBC QN AUTO: 29.6 PG (ref 27–33)
MCHC RBC AUTO-ENTMCNC: 33.2 G/DL (ref 32.3–36.5)
MCV RBC AUTO: 89.1 FL (ref 81.4–97.8)
PLATELET # BLD AUTO: 240 K/UL (ref 164–446)
PMV BLD AUTO: 11.4 FL (ref 9–12.9)
RBC # BLD AUTO: 6.22 M/UL (ref 4.7–6.1)
WBC # BLD AUTO: 7.8 K/UL (ref 4.8–10.8)

## 2025-08-04 PROCEDURE — 85027 COMPLETE CBC AUTOMATED: CPT

## 2025-08-04 PROCEDURE — 36415 COLL VENOUS BLD VENIPUNCTURE: CPT

## 2025-08-17 ENCOUNTER — HOSPITAL ENCOUNTER (INPATIENT)
Facility: MEDICAL CENTER | Age: 55
LOS: 1 days | End: 2025-08-18
Attending: EMERGENCY MEDICINE | Admitting: FAMILY MEDICINE
Payer: MEDICAID

## 2025-08-17 ENCOUNTER — ANESTHESIA EVENT (OUTPATIENT)
Dept: SURGERY | Facility: MEDICAL CENTER | Age: 55
End: 2025-08-17
Payer: MEDICAID

## 2025-08-17 ENCOUNTER — ANESTHESIA (OUTPATIENT)
Dept: SURGERY | Facility: MEDICAL CENTER | Age: 55
End: 2025-08-17
Payer: MEDICAID

## 2025-08-17 ENCOUNTER — APPOINTMENT (OUTPATIENT)
Dept: RADIOLOGY | Facility: MEDICAL CENTER | Age: 55
End: 2025-08-17
Attending: EMERGENCY MEDICINE
Payer: MEDICAID

## 2025-08-17 DIAGNOSIS — K35.80 ACUTE APPENDICITIS, UNSPECIFIED ACUTE APPENDICITIS TYPE: Primary | ICD-10-CM

## 2025-08-17 DIAGNOSIS — R11.2 NAUSEA AND VOMITING, UNSPECIFIED VOMITING TYPE: ICD-10-CM

## 2025-08-17 PROBLEM — K37 APPENDICITIS: Status: ACTIVE | Noted: 2025-08-17

## 2025-08-17 LAB
ABO + RH BLD: NORMAL
ABO GROUP BLD: NORMAL
ALBUMIN SERPL BCP-MCNC: 4.1 G/DL (ref 3.2–4.9)
ALBUMIN/GLOB SERPL: 1.3 G/DL
ALP SERPL-CCNC: 112 U/L (ref 30–99)
ALT SERPL-CCNC: 55 U/L (ref 2–50)
ANION GAP SERPL CALC-SCNC: 13 MMOL/L (ref 7–16)
APPEARANCE UR: CLEAR
APTT PPP: 28.6 SEC (ref 24.7–36)
AST SERPL-CCNC: 49 U/L (ref 12–45)
BACTERIA #/AREA URNS HPF: NORMAL /HPF
BASOPHILS # BLD AUTO: 0.3 % (ref 0–1.8)
BASOPHILS # BLD: 0.04 K/UL (ref 0–0.12)
BILIRUB SERPL-MCNC: 0.5 MG/DL (ref 0.1–1.5)
BILIRUB UR QL STRIP.AUTO: NEGATIVE
BLD GP AB SCN SERPL QL: NORMAL
BUN SERPL-MCNC: 19 MG/DL (ref 8–22)
CALCIUM ALBUM COR SERPL-MCNC: 9.2 MG/DL (ref 8.5–10.5)
CALCIUM SERPL-MCNC: 9.3 MG/DL (ref 8.5–10.5)
CASTS URNS QL MICRO: NORMAL /LPF (ref 0–2)
CHLORIDE SERPL-SCNC: 100 MMOL/L (ref 96–112)
CO2 SERPL-SCNC: 20 MMOL/L (ref 20–33)
COLOR UR: YELLOW
CREAT SERPL-MCNC: 1.32 MG/DL (ref 0.5–1.4)
EKG IMPRESSION: NORMAL
EOSINOPHIL # BLD AUTO: 0.09 K/UL (ref 0–0.51)
EOSINOPHIL NFR BLD: 0.7 % (ref 0–6.9)
EPITHELIAL CELLS 1715: NORMAL /HPF (ref 0–5)
ERYTHROCYTE [DISTWIDTH] IN BLOOD BY AUTOMATED COUNT: 46 FL (ref 35.9–50)
GFR SERPLBLD CREATININE-BSD FMLA CKD-EPI: 63 ML/MIN/1.73 M 2
GLOBULIN SER CALC-MCNC: 3.2 G/DL (ref 1.9–3.5)
GLUCOSE SERPL-MCNC: 181 MG/DL (ref 65–99)
GLUCOSE UR STRIP.AUTO-MCNC: NEGATIVE MG/DL
HCT VFR BLD AUTO: 57.7 % (ref 42–52)
HGB BLD-MCNC: 19.4 G/DL (ref 14–18)
IMM GRANULOCYTES # BLD AUTO: 0.18 K/UL (ref 0–0.11)
IMM GRANULOCYTES NFR BLD AUTO: 1.4 % (ref 0–0.9)
INR PPP: 0.94 (ref 0.87–1.13)
KETONES UR STRIP.AUTO-MCNC: NEGATIVE MG/DL
LEUKOCYTE ESTERASE UR QL STRIP.AUTO: NEGATIVE
LIPASE SERPL-CCNC: 16 U/L (ref 11–82)
LYMPHOCYTES # BLD AUTO: 0.98 K/UL (ref 1–4.8)
LYMPHOCYTES NFR BLD: 7.4 % (ref 22–41)
MCH RBC QN AUTO: 29.2 PG (ref 27–33)
MCHC RBC AUTO-ENTMCNC: 33.6 G/DL (ref 32.3–36.5)
MCV RBC AUTO: 86.9 FL (ref 81.4–97.8)
MICRO URNS: ABNORMAL
MONOCYTES # BLD AUTO: 1.23 K/UL (ref 0–0.85)
MONOCYTES NFR BLD AUTO: 9.2 % (ref 0–13.4)
NEUTROPHILS # BLD AUTO: 10.79 K/UL (ref 1.82–7.42)
NEUTROPHILS NFR BLD: 81 % (ref 44–72)
NITRITE UR QL STRIP.AUTO: NEGATIVE
NRBC # BLD AUTO: 0 K/UL
NRBC BLD-RTO: 0 /100 WBC (ref 0–0.2)
PH UR STRIP.AUTO: 5.5 [PH] (ref 5–8)
PLATELET # BLD AUTO: 286 K/UL (ref 164–446)
PMV BLD AUTO: 9.9 FL (ref 9–12.9)
POTASSIUM SERPL-SCNC: 4.2 MMOL/L (ref 3.6–5.5)
PROT SERPL-MCNC: 7.3 G/DL (ref 6–8.2)
PROT UR QL STRIP: 30 MG/DL
PROTHROMBIN TIME: 12.7 SEC (ref 12–14.6)
RBC # BLD AUTO: 6.64 M/UL (ref 4.7–6.1)
RBC # URNS HPF: NORMAL /HPF (ref 0–2)
RBC UR QL AUTO: NEGATIVE
RH BLD: NORMAL
SODIUM SERPL-SCNC: 133 MMOL/L (ref 135–145)
SP GR UR STRIP.AUTO: 1.02
UROBILINOGEN UR STRIP.AUTO-MCNC: 1 EU/DL
WBC # BLD AUTO: 13.3 K/UL (ref 4.8–10.8)
WBC #/AREA URNS HPF: NORMAL /HPF

## 2025-08-17 PROCEDURE — 86901 BLOOD TYPING SEROLOGIC RH(D): CPT

## 2025-08-17 PROCEDURE — 83690 ASSAY OF LIPASE: CPT

## 2025-08-17 PROCEDURE — 700117 HCHG RX CONTRAST REV CODE 255: Mod: UD | Performed by: EMERGENCY MEDICINE

## 2025-08-17 PROCEDURE — 700105 HCHG RX REV CODE 258: Mod: UD | Performed by: EMERGENCY MEDICINE

## 2025-08-17 PROCEDURE — 96376 TX/PRO/DX INJ SAME DRUG ADON: CPT

## 2025-08-17 PROCEDURE — 96375 TX/PRO/DX INJ NEW DRUG ADDON: CPT

## 2025-08-17 PROCEDURE — 85730 THROMBOPLASTIN TIME PARTIAL: CPT

## 2025-08-17 PROCEDURE — 770006 HCHG ROOM/CARE - MED/SURG/GYN SEMI*

## 2025-08-17 PROCEDURE — 99223 1ST HOSP IP/OBS HIGH 75: CPT | Mod: GC | Performed by: FAMILY MEDICINE

## 2025-08-17 PROCEDURE — 36415 COLL VENOUS BLD VENIPUNCTURE: CPT

## 2025-08-17 PROCEDURE — 700105 HCHG RX REV CODE 258

## 2025-08-17 PROCEDURE — 86900 BLOOD TYPING SEROLOGIC ABO: CPT

## 2025-08-17 PROCEDURE — 700111 HCHG RX REV CODE 636 W/ 250 OVERRIDE (IP): Mod: JZ,UD | Performed by: EMERGENCY MEDICINE

## 2025-08-17 PROCEDURE — 99285 EMERGENCY DEPT VISIT HI MDM: CPT

## 2025-08-17 PROCEDURE — 86850 RBC ANTIBODY SCREEN: CPT

## 2025-08-17 PROCEDURE — 85025 COMPLETE CBC W/AUTO DIFF WBC: CPT

## 2025-08-17 PROCEDURE — 96365 THER/PROPH/DIAG IV INF INIT: CPT

## 2025-08-17 PROCEDURE — 85610 PROTHROMBIN TIME: CPT

## 2025-08-17 PROCEDURE — 71045 X-RAY EXAM CHEST 1 VIEW: CPT

## 2025-08-17 PROCEDURE — 96361 HYDRATE IV INFUSION ADD-ON: CPT

## 2025-08-17 PROCEDURE — 81001 URINALYSIS AUTO W/SCOPE: CPT

## 2025-08-17 PROCEDURE — 93005 ELECTROCARDIOGRAM TRACING: CPT | Mod: TC | Performed by: EMERGENCY MEDICINE

## 2025-08-17 PROCEDURE — 80053 COMPREHEN METABOLIC PANEL: CPT

## 2025-08-17 PROCEDURE — 74177 CT ABD & PELVIS W/CONTRAST: CPT

## 2025-08-17 RX ORDER — ONDANSETRON 2 MG/ML
4 INJECTION INTRAMUSCULAR; INTRAVENOUS ONCE
Status: COMPLETED | OUTPATIENT
Start: 2025-08-17 | End: 2025-08-17

## 2025-08-17 RX ORDER — ONDANSETRON 4 MG/1
4 TABLET, ORALLY DISINTEGRATING ORAL EVERY 4 HOURS PRN
Status: DISCONTINUED | OUTPATIENT
Start: 2025-08-17 | End: 2025-08-18 | Stop reason: HOSPADM

## 2025-08-17 RX ORDER — SODIUM CHLORIDE 9 MG/ML
INJECTION, SOLUTION INTRAVENOUS CONTINUOUS
Status: DISCONTINUED | OUTPATIENT
Start: 2025-08-17 | End: 2025-08-18

## 2025-08-17 RX ORDER — SOTATERCEPT-CSRK 90 MG
45 KIT SUBCUTANEOUS
COMMUNITY
Start: 2025-08-01

## 2025-08-17 RX ORDER — SACUBITRIL AND VALSARTAN 49; 51 MG/1; MG/1
1 TABLET ORAL DAILY
Status: DISCONTINUED | OUTPATIENT
Start: 2025-08-18 | End: 2025-08-18 | Stop reason: HOSPADM

## 2025-08-17 RX ORDER — ONDANSETRON 2 MG/ML
4 INJECTION INTRAMUSCULAR; INTRAVENOUS EVERY 4 HOURS PRN
Status: DISCONTINUED | OUTPATIENT
Start: 2025-08-17 | End: 2025-08-18 | Stop reason: HOSPADM

## 2025-08-17 RX ORDER — POTASSIUM CHLORIDE 1500 MG/1
20 TABLET, EXTENDED RELEASE ORAL DAILY
Status: DISCONTINUED | OUTPATIENT
Start: 2025-08-18 | End: 2025-08-18 | Stop reason: HOSPADM

## 2025-08-17 RX ORDER — CEFTRIAXONE 2 G/1
2000 INJECTION, POWDER, FOR SOLUTION INTRAMUSCULAR; INTRAVENOUS ONCE
Status: COMPLETED | OUTPATIENT
Start: 2025-08-17 | End: 2025-08-17

## 2025-08-17 RX ORDER — LABETALOL HYDROCHLORIDE 5 MG/ML
10 INJECTION, SOLUTION INTRAVENOUS EVERY 4 HOURS PRN
Status: DISCONTINUED | OUTPATIENT
Start: 2025-08-17 | End: 2025-08-18 | Stop reason: HOSPADM

## 2025-08-17 RX ORDER — MORPHINE SULFATE 4 MG/ML
4 INJECTION INTRAVENOUS ONCE
Status: COMPLETED | OUTPATIENT
Start: 2025-08-17 | End: 2025-08-17

## 2025-08-17 RX ORDER — SODIUM CHLORIDE 9 MG/ML
1000 INJECTION, SOLUTION INTRAVENOUS ONCE
Status: COMPLETED | OUTPATIENT
Start: 2025-08-17 | End: 2025-08-17

## 2025-08-17 RX ORDER — POTASSIUM CHLORIDE 750 MG/1
20 TABLET, EXTENDED RELEASE ORAL DAILY
Status: DISCONTINUED | OUTPATIENT
Start: 2025-08-18 | End: 2025-08-17

## 2025-08-17 RX ORDER — OMEPRAZOLE 20 MG/1
20 CAPSULE, DELAYED RELEASE ORAL DAILY
Status: DISCONTINUED | OUTPATIENT
Start: 2025-08-18 | End: 2025-08-18 | Stop reason: HOSPADM

## 2025-08-17 RX ORDER — METRONIDAZOLE 500 MG/100ML
500 INJECTION, SOLUTION INTRAVENOUS ONCE
Status: COMPLETED | OUTPATIENT
Start: 2025-08-17 | End: 2025-08-17

## 2025-08-17 RX ORDER — PROCHLORPERAZINE EDISYLATE 5 MG/ML
5-10 INJECTION INTRAMUSCULAR; INTRAVENOUS EVERY 4 HOURS PRN
Status: DISCONTINUED | OUTPATIENT
Start: 2025-08-17 | End: 2025-08-18 | Stop reason: HOSPADM

## 2025-08-17 RX ORDER — FUROSEMIDE 20 MG/1
20 TABLET ORAL DAILY
Status: DISCONTINUED | OUTPATIENT
Start: 2025-08-18 | End: 2025-08-18

## 2025-08-17 RX ORDER — PROMETHAZINE HYDROCHLORIDE 12.5 MG/1
12.5-25 SUPPOSITORY RECTAL EVERY 4 HOURS PRN
Status: DISCONTINUED | OUTPATIENT
Start: 2025-08-17 | End: 2025-08-18 | Stop reason: HOSPADM

## 2025-08-17 RX ORDER — OMEPRAZOLE 20 MG/1
20 CAPSULE, DELAYED RELEASE ORAL DAILY
COMMUNITY

## 2025-08-17 RX ORDER — PROMETHAZINE HYDROCHLORIDE 25 MG/1
12.5-25 TABLET ORAL EVERY 4 HOURS PRN
Status: DISCONTINUED | OUTPATIENT
Start: 2025-08-17 | End: 2025-08-18 | Stop reason: HOSPADM

## 2025-08-17 RX ORDER — ASPIRIN 81 MG/1
81 TABLET ORAL DAILY
Status: DISCONTINUED | OUTPATIENT
Start: 2025-08-18 | End: 2025-08-18 | Stop reason: HOSPADM

## 2025-08-17 RX ADMIN — MORPHINE SULFATE 4 MG: 4 INJECTION, SOLUTION INTRAMUSCULAR; INTRAVENOUS at 16:15

## 2025-08-17 RX ADMIN — CEFTRIAXONE SODIUM 2000 MG: 2 INJECTION, POWDER, FOR SOLUTION INTRAMUSCULAR; INTRAVENOUS at 18:46

## 2025-08-17 RX ADMIN — MORPHINE SULFATE 4 MG: 4 INJECTION, SOLUTION INTRAMUSCULAR; INTRAVENOUS at 17:44

## 2025-08-17 RX ADMIN — SODIUM CHLORIDE: 9 INJECTION, SOLUTION INTRAVENOUS at 22:37

## 2025-08-17 RX ADMIN — ONDANSETRON 4 MG: 2 INJECTION INTRAMUSCULAR; INTRAVENOUS at 16:15

## 2025-08-17 RX ADMIN — IOHEXOL 100 ML: 350 INJECTION, SOLUTION INTRAVENOUS at 18:45

## 2025-08-17 RX ADMIN — SODIUM CHLORIDE 1000 ML: 9 INJECTION, SOLUTION INTRAVENOUS at 16:30

## 2025-08-17 RX ADMIN — METRONIDAZOLE 500 MG: 500 INJECTION, SOLUTION INTRAVENOUS at 18:50

## 2025-08-17 ASSESSMENT — LIFESTYLE VARIABLES
HAVE YOU EVER FELT YOU SHOULD CUT DOWN ON YOUR DRINKING: NO
TOTAL SCORE: 0
CONSUMPTION TOTAL: NEGATIVE
EVER FELT BAD OR GUILTY ABOUT YOUR DRINKING: NO
TOTAL SCORE: 0
HOW MANY TIMES IN THE PAST YEAR HAVE YOU HAD 5 OR MORE DRINKS IN A DAY: 0
EVER HAD A DRINK FIRST THING IN THE MORNING TO STEADY YOUR NERVES TO GET RID OF A HANGOVER: NO
TOTAL SCORE: 0
AVERAGE NUMBER OF DAYS PER WEEK YOU HAVE A DRINK CONTAINING ALCOHOL: 1
ON A TYPICAL DAY WHEN YOU DRINK ALCOHOL HOW MANY DRINKS DO YOU HAVE: 0
ALCOHOL_USE: YES
HAVE PEOPLE ANNOYED YOU BY CRITICIZING YOUR DRINKING: NO

## 2025-08-17 ASSESSMENT — FIBROSIS 4 INDEX
FIB4 SCORE: 1.74
FIB4 SCORE: 1.32

## 2025-08-17 ASSESSMENT — COGNITIVE AND FUNCTIONAL STATUS - GENERAL
SUGGESTED CMS G CODE MODIFIER DAILY ACTIVITY: CH
MOBILITY SCORE: 24
DAILY ACTIVITIY SCORE: 24
SUGGESTED CMS G CODE MODIFIER MOBILITY: CH

## 2025-08-17 ASSESSMENT — PATIENT HEALTH QUESTIONNAIRE - PHQ9
2. FEELING DOWN, DEPRESSED, IRRITABLE, OR HOPELESS: NOT AT ALL
SUM OF ALL RESPONSES TO PHQ9 QUESTIONS 1 AND 2: 0
1. LITTLE INTEREST OR PLEASURE IN DOING THINGS: NOT AT ALL

## 2025-08-17 ASSESSMENT — PAIN DESCRIPTION - PAIN TYPE
TYPE: ACUTE PAIN

## 2025-08-18 ENCOUNTER — APPOINTMENT (OUTPATIENT)
Dept: RADIOLOGY | Facility: MEDICAL CENTER | Age: 55
End: 2025-08-18
Attending: ANESTHESIOLOGY
Payer: MEDICAID

## 2025-08-18 ENCOUNTER — APPOINTMENT (OUTPATIENT)
Dept: CARDIOLOGY | Facility: MEDICAL CENTER | Age: 55
End: 2025-08-18
Attending: NURSE PRACTITIONER
Payer: MEDICAID

## 2025-08-18 VITALS
WEIGHT: 214.95 LBS | BODY MASS INDEX: 32.58 KG/M2 | DIASTOLIC BLOOD PRESSURE: 55 MMHG | TEMPERATURE: 97.5 F | HEIGHT: 68 IN | RESPIRATION RATE: 12 BRPM | OXYGEN SATURATION: 89 % | SYSTOLIC BLOOD PRESSURE: 108 MMHG | HEART RATE: 74 BPM

## 2025-08-18 DIAGNOSIS — I25.83 CORONARY ARTERY DISEASE DUE TO LIPID RICH PLAQUE: ICD-10-CM

## 2025-08-18 DIAGNOSIS — I25.10 CORONARY ARTERY DISEASE DUE TO LIPID RICH PLAQUE: ICD-10-CM

## 2025-08-18 PROBLEM — J96.01 ACUTE HYPOXEMIC RESPIRATORY FAILURE (HCC): Status: RESOLVED | Noted: 2025-08-18 | Resolved: 2025-08-18

## 2025-08-18 PROBLEM — R57.9 SHOCK (HCC): Status: RESOLVED | Noted: 2025-08-18 | Resolved: 2025-08-18

## 2025-08-18 PROBLEM — F10.90 ALCOHOL USE DISORDER: Status: RESOLVED | Noted: 2023-07-07 | Resolved: 2025-08-18

## 2025-08-18 PROBLEM — R55 SYNCOPE: Status: RESOLVED | Noted: 2023-07-14 | Resolved: 2025-08-18

## 2025-08-18 PROBLEM — R57.9 SHOCK (HCC): Status: ACTIVE | Noted: 2025-08-18

## 2025-08-18 PROBLEM — R74.01 TRANSAMINITIS: Status: RESOLVED | Noted: 2025-08-18 | Resolved: 2025-08-18

## 2025-08-18 PROBLEM — R74.01 TRANSAMINITIS: Status: ACTIVE | Noted: 2025-08-18

## 2025-08-18 PROBLEM — F15.11 HISTORY OF METHAMPHETAMINE ABUSE (HCC): Status: RESOLVED | Noted: 2025-08-18 | Resolved: 2025-08-18

## 2025-08-18 PROBLEM — F10.11 ALCOHOL ABUSE, IN REMISSION: Status: RESOLVED | Noted: 2023-11-20 | Resolved: 2025-08-18

## 2025-08-18 PROBLEM — T17.908A ASPIRATION INTO AIRWAY: Status: ACTIVE | Noted: 2025-08-18

## 2025-08-18 PROBLEM — J96.01 ACUTE HYPOXEMIC RESPIRATORY FAILURE (HCC): Status: ACTIVE | Noted: 2025-08-18

## 2025-08-18 PROBLEM — R53.82 CHRONIC FATIGUE: Status: RESOLVED | Noted: 2023-07-07 | Resolved: 2025-08-18

## 2025-08-18 PROBLEM — R42 LIGHT HEADEDNESS: Status: RESOLVED | Noted: 2023-07-14 | Resolved: 2025-08-18

## 2025-08-18 PROBLEM — E11.65 TYPE 2 DIABETES MELLITUS WITH HYPERGLYCEMIA, WITHOUT LONG-TERM CURRENT USE OF INSULIN (HCC): Status: ACTIVE | Noted: 2025-08-18

## 2025-08-18 PROBLEM — F15.11 HISTORY OF METHAMPHETAMINE ABUSE (HCC): Status: ACTIVE | Noted: 2025-08-18

## 2025-08-18 PROBLEM — I27.20 PULMONARY HTN (HCC): Status: ACTIVE | Noted: 2025-08-18

## 2025-08-18 PROBLEM — I50.9 HEART FAILURE (HCC): Status: RESOLVED | Noted: 2023-12-10 | Resolved: 2025-08-18

## 2025-08-18 PROBLEM — T17.908A ASPIRATION INTO AIRWAY: Status: RESOLVED | Noted: 2025-08-18 | Resolved: 2025-08-18

## 2025-08-18 LAB
ACTION RANGE TRIGGERED IACRT: NO
ALBUMIN SERPL BCP-MCNC: 3.5 G/DL (ref 3.2–4.9)
ALBUMIN SERPL BCP-MCNC: 3.6 G/DL (ref 3.2–4.9)
ALBUMIN/GLOB SERPL: 1.2 G/DL
ALBUMIN/GLOB SERPL: 1.3 G/DL
ALP SERPL-CCNC: 103 U/L (ref 30–99)
ALP SERPL-CCNC: 114 U/L (ref 30–99)
ALT SERPL-CCNC: 42 U/L (ref 2–50)
ALT SERPL-CCNC: 53 U/L (ref 2–50)
ANION GAP SERPL CALC-SCNC: 13 MMOL/L (ref 7–16)
ANION GAP SERPL CALC-SCNC: 14 MMOL/L (ref 7–16)
ARTERIAL PATENCY WRIST A: ABNORMAL
AST SERPL-CCNC: 32 U/L (ref 12–45)
AST SERPL-CCNC: 40 U/L (ref 12–45)
BASE EXCESS BLDA CALC-SCNC: -6 MMOL/L (ref -4–3)
BASOPHILS # BLD AUTO: 0.3 % (ref 0–1.8)
BASOPHILS # BLD: 0.05 K/UL (ref 0–0.12)
BILIRUB SERPL-MCNC: 0.3 MG/DL (ref 0.1–1.5)
BILIRUB SERPL-MCNC: 0.5 MG/DL (ref 0.1–1.5)
BUN SERPL-MCNC: 16 MG/DL (ref 8–22)
BUN SERPL-MCNC: 22 MG/DL (ref 8–22)
CALCIUM ALBUM COR SERPL-MCNC: 8.7 MG/DL (ref 8.5–10.5)
CALCIUM ALBUM COR SERPL-MCNC: 8.8 MG/DL (ref 8.5–10.5)
CALCIUM SERPL-MCNC: 8.3 MG/DL (ref 8.5–10.5)
CALCIUM SERPL-MCNC: 8.5 MG/DL (ref 8.5–10.5)
CHLORIDE SERPL-SCNC: 103 MMOL/L (ref 96–112)
CHLORIDE SERPL-SCNC: 104 MMOL/L (ref 96–112)
CO2 BLDA-SCNC: 20 MMOL/L (ref 20–33)
CO2 SERPL-SCNC: 16 MMOL/L (ref 20–33)
CO2 SERPL-SCNC: 17 MMOL/L (ref 20–33)
CREAT SERPL-MCNC: 1.62 MG/DL (ref 0.5–1.4)
CREAT SERPL-MCNC: 1.68 MG/DL (ref 0.5–1.4)
DELSYS IDSYS: ABNORMAL
EKG IMPRESSION: NORMAL
EOSINOPHIL # BLD AUTO: 0.01 K/UL (ref 0–0.51)
EOSINOPHIL NFR BLD: 0.1 % (ref 0–6.9)
ERYTHROCYTE [DISTWIDTH] IN BLOOD BY AUTOMATED COUNT: 46.1 FL (ref 35.9–50)
EST. AVERAGE GLUCOSE BLD GHB EST-MCNC: 151 MG/DL
GFR SERPLBLD CREATININE-BSD FMLA CKD-EPI: 47 ML/MIN/1.73 M 2
GFR SERPLBLD CREATININE-BSD FMLA CKD-EPI: 49 ML/MIN/1.73 M 2
GLOBULIN SER CALC-MCNC: 2.8 G/DL (ref 1.9–3.5)
GLOBULIN SER CALC-MCNC: 3 G/DL (ref 1.9–3.5)
GLUCOSE BLD STRIP.AUTO-MCNC: 188 MG/DL (ref 65–99)
GLUCOSE BLD STRIP.AUTO-MCNC: 188 MG/DL (ref 65–99)
GLUCOSE BLD STRIP.AUTO-MCNC: 240 MG/DL (ref 65–99)
GLUCOSE SERPL-MCNC: 204 MG/DL (ref 65–99)
GLUCOSE SERPL-MCNC: 233 MG/DL (ref 65–99)
HBA1C MFR BLD: 6.9 % (ref 4–5.6)
HCO3 BLDA-SCNC: 19 MMOL/L (ref 21–28)
HCT VFR BLD AUTO: 53.5 % (ref 42–52)
HGB BLD-MCNC: 18 G/DL (ref 14–18)
IMM GRANULOCYTES # BLD AUTO: 0.23 K/UL (ref 0–0.11)
IMM GRANULOCYTES NFR BLD AUTO: 1.5 % (ref 0–0.9)
INST. QUALIFIED PATIENT IIQPT: YES
LACTATE BLD-SCNC: 0.52 MMOL/L (ref 0.5–2)
LACTATE SERPL-SCNC: 1.1 MMOL/L (ref 0.5–2)
LPM ILPM: 45
LV EJECT FRACT  99904: 65
LYMPHOCYTES # BLD AUTO: 0.53 K/UL (ref 1–4.8)
LYMPHOCYTES NFR BLD: 3.4 % (ref 22–41)
Lab: ABNORMAL
MCH RBC QN AUTO: 29.4 PG (ref 27–33)
MCHC RBC AUTO-ENTMCNC: 33.6 G/DL (ref 32.3–36.5)
MCV RBC AUTO: 87.4 FL (ref 81.4–97.8)
MONOCYTES # BLD AUTO: 0.85 K/UL (ref 0–0.85)
MONOCYTES NFR BLD AUTO: 5.4 % (ref 0–13.4)
NEUTROPHILS # BLD AUTO: 14.02 K/UL (ref 1.82–7.42)
NEUTROPHILS NFR BLD: 89.3 % (ref 44–72)
NRBC # BLD AUTO: 0 K/UL
NRBC BLD-RTO: 0 /100 WBC (ref 0–0.2)
NT-PROBNP SERPL IA-MCNC: 1161 PG/ML (ref 0–125)
O2/TOTAL GAS SETTING VFR VENT: 50 %
O2/TOTAL GAS SETTING VFR VENT: 50 %
PATHOLOGY CONSULT NOTE: NORMAL
PCO2 BLDA: 38 MMHG (ref 32–48)
PCO2 TEMP ADJ BLDA: 37 MMHG (ref 32–48)
PH BLDA: 7.31 [PH] (ref 7.35–7.45)
PH TEMP ADJ BLDA: 7.32 [PH] (ref 7.35–7.45)
PLATELET # BLD AUTO: 288 K/UL (ref 164–446)
PMV BLD AUTO: 10 FL (ref 9–12.9)
PO2 BLDA: 85 MMHG (ref 83–108)
PO2 TEMP ADJ BLDA: 82 MMHG (ref 83–108)
POTASSIUM SERPL-SCNC: 4.2 MMOL/L (ref 3.6–5.5)
POTASSIUM SERPL-SCNC: 4.8 MMOL/L (ref 3.6–5.5)
PROCALCITONIN SERPL-MCNC: 0.65 NG/ML
PROT SERPL-MCNC: 6.3 G/DL (ref 6–8.2)
PROT SERPL-MCNC: 6.6 G/DL (ref 6–8.2)
RBC # BLD AUTO: 6.12 M/UL (ref 4.7–6.1)
SAO2 % BLDA: 96 % (ref 93–99)
SODIUM SERPL-SCNC: 133 MMOL/L (ref 135–145)
SODIUM SERPL-SCNC: 134 MMOL/L (ref 135–145)
SPECIMEN DRAWN FROM PATIENT: ABNORMAL
WBC # BLD AUTO: 15.7 K/UL (ref 4.8–10.8)

## 2025-08-18 PROCEDURE — A9270 NON-COVERED ITEM OR SERVICE: HCPCS | Performed by: NURSE PRACTITIONER

## 2025-08-18 PROCEDURE — 99292 CRITICAL CARE ADDL 30 MIN: CPT | Mod: 25 | Performed by: NURSE PRACTITIONER

## 2025-08-18 PROCEDURE — 71045 X-RAY EXAM CHEST 1 VIEW: CPT

## 2025-08-18 PROCEDURE — 160048 HCHG OR STATISTICAL LEVEL 1-5: Performed by: SURGERY

## 2025-08-18 PROCEDURE — 160002 HCHG RECOVERY MINUTES (STAT): Performed by: SURGERY

## 2025-08-18 PROCEDURE — 83605 ASSAY OF LACTIC ACID: CPT | Performed by: INTERNAL MEDICINE

## 2025-08-18 PROCEDURE — 83036 HEMOGLOBIN GLYCOSYLATED A1C: CPT

## 2025-08-18 PROCEDURE — 88304 TISSUE EXAM BY PATHOLOGIST: CPT | Mod: 26 | Performed by: PATHOLOGY

## 2025-08-18 PROCEDURE — 83880 ASSAY OF NATRIURETIC PEPTIDE: CPT

## 2025-08-18 PROCEDURE — 93306 TTE W/DOPPLER COMPLETE: CPT | Mod: 26 | Performed by: INTERNAL MEDICINE

## 2025-08-18 PROCEDURE — 82803 BLOOD GASES ANY COMBINATION: CPT | Performed by: INTERNAL MEDICINE

## 2025-08-18 PROCEDURE — 93005 ELECTROCARDIOGRAM TRACING: CPT | Mod: TC | Performed by: NURSE PRACTITIONER

## 2025-08-18 PROCEDURE — 160015 HCHG STAT PREOP MINUTES: Performed by: SURGERY

## 2025-08-18 PROCEDURE — 700105 HCHG RX REV CODE 258: Performed by: ANESTHESIOLOGY

## 2025-08-18 PROCEDURE — 88304 TISSUE EXAM BY PATHOLOGIST: CPT | Performed by: PATHOLOGY

## 2025-08-18 PROCEDURE — 700111 HCHG RX REV CODE 636 W/ 250 OVERRIDE (IP): Performed by: SURGERY

## 2025-08-18 PROCEDURE — 99292 CRITICAL CARE ADDL 30 MIN: CPT | Mod: 25 | Performed by: EMERGENCY MEDICINE

## 2025-08-18 PROCEDURE — 160029 HCHG SURGERY MINUTES - 1ST 30 MINS LEVEL 4: Performed by: SURGERY

## 2025-08-18 PROCEDURE — 700101 HCHG RX REV CODE 250: Performed by: ANESTHESIOLOGY

## 2025-08-18 PROCEDURE — 700102 HCHG RX REV CODE 250 W/ 637 OVERRIDE(OP): Performed by: EMERGENCY MEDICINE

## 2025-08-18 PROCEDURE — 37799 UNLISTED PX VASCULAR SURGERY: CPT

## 2025-08-18 PROCEDURE — 99291 CRITICAL CARE FIRST HOUR: CPT | Mod: 25 | Performed by: NURSE PRACTITIONER

## 2025-08-18 PROCEDURE — 160041 HCHG SURGERY MINUTES - EA ADDL 1 MIN LEVEL 4: Performed by: SURGERY

## 2025-08-18 PROCEDURE — 80053 COMPREHEN METABOLIC PANEL: CPT

## 2025-08-18 PROCEDURE — A9270 NON-COVERED ITEM OR SERVICE: HCPCS | Performed by: FAMILY MEDICINE

## 2025-08-18 PROCEDURE — 700102 HCHG RX REV CODE 250 W/ 637 OVERRIDE(OP)

## 2025-08-18 PROCEDURE — 85025 COMPLETE CBC W/AUTO DIFF WBC: CPT

## 2025-08-18 PROCEDURE — 84145 PROCALCITONIN (PCT): CPT

## 2025-08-18 PROCEDURE — 93306 TTE W/DOPPLER COMPLETE: CPT

## 2025-08-18 PROCEDURE — 700102 HCHG RX REV CODE 250 W/ 637 OVERRIDE(OP): Performed by: NURSE PRACTITIONER

## 2025-08-18 PROCEDURE — 160192 HCHG ANESTHESIA COMPLEX: Performed by: SURGERY

## 2025-08-18 PROCEDURE — 700102 HCHG RX REV CODE 250 W/ 637 OVERRIDE(OP): Performed by: FAMILY MEDICINE

## 2025-08-18 PROCEDURE — 700117 HCHG RX CONTRAST REV CODE 255: Performed by: NURSE PRACTITIONER

## 2025-08-18 PROCEDURE — 99238 HOSP IP/OBS DSCHRG MGMT 30/<: CPT | Performed by: EMERGENCY MEDICINE

## 2025-08-18 PROCEDURE — 82962 GLUCOSE BLOOD TEST: CPT | Performed by: INTERNAL MEDICINE

## 2025-08-18 PROCEDURE — A9270 NON-COVERED ITEM OR SERVICE: HCPCS

## 2025-08-18 PROCEDURE — 83605 ASSAY OF LACTIC ACID: CPT

## 2025-08-18 PROCEDURE — 700111 HCHG RX REV CODE 636 W/ 250 OVERRIDE (IP): Performed by: ANESTHESIOLOGY

## 2025-08-18 PROCEDURE — 82962 GLUCOSE BLOOD TEST: CPT | Performed by: EMERGENCY MEDICINE

## 2025-08-18 PROCEDURE — 94640 AIRWAY INHALATION TREATMENT: CPT

## 2025-08-18 PROCEDURE — 93010 ELECTROCARDIOGRAM REPORT: CPT | Performed by: INTERNAL MEDICINE

## 2025-08-18 PROCEDURE — 700111 HCHG RX REV CODE 636 W/ 250 OVERRIDE (IP): Mod: JZ | Performed by: NURSE PRACTITIONER

## 2025-08-18 PROCEDURE — 160195 HCHG PACU COMPLEX - 1ST 60 MINS: Performed by: SURGERY

## 2025-08-18 RX ORDER — DIPHENHYDRAMINE HYDROCHLORIDE 50 MG/ML
12.5 INJECTION, SOLUTION INTRAMUSCULAR; INTRAVENOUS
Status: DISCONTINUED | OUTPATIENT
Start: 2025-08-18 | End: 2025-08-18 | Stop reason: HOSPADM

## 2025-08-18 RX ORDER — EPHEDRINE SULFATE 50 MG/ML
5 INJECTION, SOLUTION INTRAVENOUS
Status: DISCONTINUED | OUTPATIENT
Start: 2025-08-18 | End: 2025-08-18 | Stop reason: HOSPADM

## 2025-08-18 RX ORDER — SODIUM CHLORIDE, SODIUM LACTATE, POTASSIUM CHLORIDE, CALCIUM CHLORIDE 600; 310; 30; 20 MG/100ML; MG/100ML; MG/100ML; MG/100ML
INJECTION, SOLUTION INTRAVENOUS
Status: DISCONTINUED | OUTPATIENT
Start: 2025-08-18 | End: 2025-08-18 | Stop reason: SURG

## 2025-08-18 RX ORDER — NOREPINEPHRINE BITARTRATE 0.03 MG/ML
0-1 INJECTION, SOLUTION INTRAVENOUS CONTINUOUS
Status: DISCONTINUED | OUTPATIENT
Start: 2025-08-18 | End: 2025-08-18 | Stop reason: HOSPADM

## 2025-08-18 RX ORDER — DEXTROSE MONOHYDRATE 25 G/50ML
25 INJECTION, SOLUTION INTRAVENOUS
Status: DISCONTINUED | OUTPATIENT
Start: 2025-08-18 | End: 2025-08-18 | Stop reason: HOSPADM

## 2025-08-18 RX ORDER — HYDRALAZINE HYDROCHLORIDE 20 MG/ML
5 INJECTION INTRAMUSCULAR; INTRAVENOUS
Status: DISCONTINUED | OUTPATIENT
Start: 2025-08-18 | End: 2025-08-18 | Stop reason: HOSPADM

## 2025-08-18 RX ORDER — BUPIVACAINE HYDROCHLORIDE 2.5 MG/ML
INJECTION, SOLUTION EPIDURAL; INFILTRATION; INTRACAUDAL; PERINEURAL
Status: DISCONTINUED | OUTPATIENT
Start: 2025-08-18 | End: 2025-08-18 | Stop reason: HOSPADM

## 2025-08-18 RX ORDER — NICOTINE 21 MG/24HR
14 PATCH, TRANSDERMAL 24 HOURS TRANSDERMAL
Status: DISCONTINUED | OUTPATIENT
Start: 2025-08-18 | End: 2025-08-18 | Stop reason: HOSPADM

## 2025-08-18 RX ORDER — LIDOCAINE HYDROCHLORIDE 40 MG/ML
SOLUTION TOPICAL PRN
Status: DISCONTINUED | OUTPATIENT
Start: 2025-08-18 | End: 2025-08-18 | Stop reason: SURG

## 2025-08-18 RX ORDER — EPHEDRINE SULFATE 50 MG/ML
10 INJECTION, SOLUTION INTRAVENOUS ONCE
Status: COMPLETED | OUTPATIENT
Start: 2025-08-18 | End: 2025-08-18

## 2025-08-18 RX ORDER — SODIUM CHLORIDE, SODIUM LACTATE, POTASSIUM CHLORIDE, CALCIUM CHLORIDE 600; 310; 30; 20 MG/100ML; MG/100ML; MG/100ML; MG/100ML
INJECTION, SOLUTION INTRAVENOUS CONTINUOUS
Status: DISCONTINUED | OUTPATIENT
Start: 2025-08-18 | End: 2025-08-18

## 2025-08-18 RX ORDER — AMOXICILLIN 250 MG
2 CAPSULE ORAL EVERY EVENING
Status: DISCONTINUED | OUTPATIENT
Start: 2025-08-18 | End: 2025-08-18 | Stop reason: HOSPADM

## 2025-08-18 RX ORDER — PHENYLEPHRINE HCL IN 0.9% NACL 1 MG/10 ML
SYRINGE (ML) INTRAVENOUS PRN
Status: DISCONTINUED | OUTPATIENT
Start: 2025-08-18 | End: 2025-08-18 | Stop reason: SURG

## 2025-08-18 RX ORDER — HYDROMORPHONE HYDROCHLORIDE 2 MG/ML
INJECTION, SOLUTION INTRAMUSCULAR; INTRAVENOUS; SUBCUTANEOUS PRN
Status: DISCONTINUED | OUTPATIENT
Start: 2025-08-18 | End: 2025-08-18 | Stop reason: SURG

## 2025-08-18 RX ORDER — HALOPERIDOL 5 MG/ML
1 INJECTION INTRAMUSCULAR
Status: DISCONTINUED | OUTPATIENT
Start: 2025-08-18 | End: 2025-08-18 | Stop reason: HOSPADM

## 2025-08-18 RX ORDER — KETOROLAC TROMETHAMINE 15 MG/ML
INJECTION, SOLUTION INTRAMUSCULAR; INTRAVENOUS PRN
Status: DISCONTINUED | OUTPATIENT
Start: 2025-08-18 | End: 2025-08-18 | Stop reason: SURG

## 2025-08-18 RX ORDER — CEFAZOLIN SODIUM 1 G/3ML
INJECTION, POWDER, FOR SOLUTION INTRAMUSCULAR; INTRAVENOUS PRN
Status: DISCONTINUED | OUTPATIENT
Start: 2025-08-18 | End: 2025-08-18 | Stop reason: SURG

## 2025-08-18 RX ORDER — OXYCODONE HCL 5 MG/5 ML
10 SOLUTION, ORAL ORAL
Status: DISCONTINUED | OUTPATIENT
Start: 2025-08-18 | End: 2025-08-18 | Stop reason: HOSPADM

## 2025-08-18 RX ORDER — FUROSEMIDE 20 MG/1
20 TABLET ORAL DAILY
Status: DISCONTINUED | OUTPATIENT
Start: 2025-08-19 | End: 2025-08-18 | Stop reason: HOSPADM

## 2025-08-18 RX ORDER — FUROSEMIDE 10 MG/ML
40 INJECTION INTRAMUSCULAR; INTRAVENOUS ONCE
Status: COMPLETED | OUTPATIENT
Start: 2025-08-18 | End: 2025-08-18

## 2025-08-18 RX ORDER — IPRATROPIUM BROMIDE AND ALBUTEROL SULFATE 2.5; .5 MG/3ML; MG/3ML
3 SOLUTION RESPIRATORY (INHALATION)
Status: DISCONTINUED | OUTPATIENT
Start: 2025-08-18 | End: 2025-08-18 | Stop reason: HOSPADM

## 2025-08-18 RX ORDER — LIDOCAINE HYDROCHLORIDE 20 MG/ML
INJECTION, SOLUTION EPIDURAL; INFILTRATION; INTRACAUDAL; PERINEURAL PRN
Status: DISCONTINUED | OUTPATIENT
Start: 2025-08-18 | End: 2025-08-18 | Stop reason: SURG

## 2025-08-18 RX ORDER — HYDROMORPHONE HYDROCHLORIDE 1 MG/ML
0.1 INJECTION, SOLUTION INTRAMUSCULAR; INTRAVENOUS; SUBCUTANEOUS
Status: DISCONTINUED | OUTPATIENT
Start: 2025-08-18 | End: 2025-08-18 | Stop reason: HOSPADM

## 2025-08-18 RX ORDER — POLYETHYLENE GLYCOL 3350 17 G/17G
1 POWDER, FOR SOLUTION ORAL
Status: DISCONTINUED | OUTPATIENT
Start: 2025-08-18 | End: 2025-08-18 | Stop reason: HOSPADM

## 2025-08-18 RX ORDER — HYDROMORPHONE HYDROCHLORIDE 1 MG/ML
0.4 INJECTION, SOLUTION INTRAMUSCULAR; INTRAVENOUS; SUBCUTANEOUS
Status: DISCONTINUED | OUTPATIENT
Start: 2025-08-18 | End: 2025-08-18 | Stop reason: HOSPADM

## 2025-08-18 RX ORDER — ONDANSETRON 2 MG/ML
4 INJECTION INTRAMUSCULAR; INTRAVENOUS
Status: DISCONTINUED | OUTPATIENT
Start: 2025-08-18 | End: 2025-08-18 | Stop reason: HOSPADM

## 2025-08-18 RX ORDER — HYDROMORPHONE HYDROCHLORIDE 1 MG/ML
0.2 INJECTION, SOLUTION INTRAMUSCULAR; INTRAVENOUS; SUBCUTANEOUS
Status: DISCONTINUED | OUTPATIENT
Start: 2025-08-18 | End: 2025-08-18 | Stop reason: HOSPADM

## 2025-08-18 RX ORDER — ROCURONIUM BROMIDE 10 MG/ML
INJECTION, SOLUTION INTRAVENOUS PRN
Status: DISCONTINUED | OUTPATIENT
Start: 2025-08-18 | End: 2025-08-18 | Stop reason: SURG

## 2025-08-18 RX ORDER — ENOXAPARIN SODIUM 100 MG/ML
40 INJECTION SUBCUTANEOUS DAILY
Status: DISCONTINUED | OUTPATIENT
Start: 2025-08-18 | End: 2025-08-18 | Stop reason: HOSPADM

## 2025-08-18 RX ORDER — MIDAZOLAM HYDROCHLORIDE 1 MG/ML
1 INJECTION INTRAMUSCULAR; INTRAVENOUS
Status: DISCONTINUED | OUTPATIENT
Start: 2025-08-18 | End: 2025-08-18 | Stop reason: HOSPADM

## 2025-08-18 RX ORDER — INSULIN LISPRO 100 [IU]/ML
1-6 INJECTION, SOLUTION INTRAVENOUS; SUBCUTANEOUS EVERY 6 HOURS
Status: DISCONTINUED | OUTPATIENT
Start: 2025-08-18 | End: 2025-08-18 | Stop reason: HOSPADM

## 2025-08-18 RX ORDER — ACETAMINOPHEN 325 MG/1
650 TABLET ORAL EVERY 6 HOURS PRN
Status: DISCONTINUED | OUTPATIENT
Start: 2025-08-18 | End: 2025-08-18 | Stop reason: HOSPADM

## 2025-08-18 RX ORDER — DEXMEDETOMIDINE HYDROCHLORIDE 100 UG/ML
INJECTION, SOLUTION INTRAVENOUS PRN
Status: DISCONTINUED | OUTPATIENT
Start: 2025-08-18 | End: 2025-08-18 | Stop reason: SURG

## 2025-08-18 RX ORDER — ALBUTEROL SULFATE 5 MG/ML
2.5 SOLUTION RESPIRATORY (INHALATION)
Status: DISCONTINUED | OUTPATIENT
Start: 2025-08-18 | End: 2025-08-18 | Stop reason: HOSPADM

## 2025-08-18 RX ORDER — ONDANSETRON 2 MG/ML
INJECTION INTRAMUSCULAR; INTRAVENOUS PRN
Status: DISCONTINUED | OUTPATIENT
Start: 2025-08-18 | End: 2025-08-18 | Stop reason: SURG

## 2025-08-18 RX ORDER — OXYCODONE HCL 5 MG/5 ML
5 SOLUTION, ORAL ORAL
Status: DISCONTINUED | OUTPATIENT
Start: 2025-08-18 | End: 2025-08-18 | Stop reason: HOSPADM

## 2025-08-18 RX ORDER — DEXAMETHASONE SODIUM PHOSPHATE 4 MG/ML
INJECTION, SOLUTION INTRA-ARTICULAR; INTRALESIONAL; INTRAMUSCULAR; INTRAVENOUS; SOFT TISSUE PRN
Status: DISCONTINUED | OUTPATIENT
Start: 2025-08-18 | End: 2025-08-18 | Stop reason: SURG

## 2025-08-18 RX ADMIN — HYDROMORPHONE HYDROCHLORIDE 1 MG: 2 INJECTION INTRAMUSCULAR; INTRAVENOUS; SUBCUTANEOUS at 00:44

## 2025-08-18 RX ADMIN — ROCURONIUM BROMIDE 50 MG: 10 INJECTION INTRAVENOUS at 00:33

## 2025-08-18 RX ADMIN — OMEPRAZOLE 20 MG: 20 CAPSULE, DELAYED RELEASE ORAL at 05:31

## 2025-08-18 RX ADMIN — DEXAMETHASONE SODIUM PHOSPHATE 8 MG: 4 INJECTION INTRA-ARTICULAR; INTRALESIONAL; INTRAMUSCULAR; INTRAVENOUS; SOFT TISSUE at 00:35

## 2025-08-18 RX ADMIN — ALBUTEROL SULFATE 2.5 MG: 2.5 SOLUTION RESPIRATORY (INHALATION) at 01:30

## 2025-08-18 RX ADMIN — INSULIN LISPRO 1 UNITS: 100 INJECTION, SOLUTION INTRAVENOUS; SUBCUTANEOUS at 11:38

## 2025-08-18 RX ADMIN — LIDOCAINE HYDROCHLORIDE 20 MG: 20 INJECTION, SOLUTION EPIDURAL; INFILTRATION; INTRACAUDAL; PERINEURAL at 00:31

## 2025-08-18 RX ADMIN — DEXMEDETOMIDINE 10 MCG: 100 INJECTION, SOLUTION INTRAVENOUS at 00:45

## 2025-08-18 RX ADMIN — ONDANSETRON 4 MG: 2 INJECTION INTRAMUSCULAR; INTRAVENOUS at 00:59

## 2025-08-18 RX ADMIN — MACITENTAN AND TADALAFIL 1 TABLET: 10; 40 TABLET, FILM COATED ORAL at 09:44

## 2025-08-18 RX ADMIN — SUGAMMADEX 200 MG: 100 INJECTION, SOLUTION INTRAVENOUS at 00:59

## 2025-08-18 RX ADMIN — SODIUM CHLORIDE, POTASSIUM CHLORIDE, SODIUM LACTATE AND CALCIUM CHLORIDE: 600; 310; 30; 20 INJECTION, SOLUTION INTRAVENOUS at 00:26

## 2025-08-18 RX ADMIN — NOREPINEPHRINE BITARTRATE 0.02 MCG/KG/MIN: 1 INJECTION, SOLUTION, CONCENTRATE INTRAVENOUS at 02:15

## 2025-08-18 RX ADMIN — FENTANYL CITRATE 50 MCG: 50 INJECTION, SOLUTION INTRAMUSCULAR; INTRAVENOUS at 00:33

## 2025-08-18 RX ADMIN — NICOTINE 14 MG: 14 PATCH TRANSDERMAL at 05:32

## 2025-08-18 RX ADMIN — LIDOCAINE HYDROCHLORIDE 4 ML: 40 SOLUTION TOPICAL at 00:33

## 2025-08-18 RX ADMIN — KETOROLAC TROMETHAMINE 15 MG: 15 INJECTION, SOLUTION INTRAMUSCULAR; INTRAVENOUS at 00:59

## 2025-08-18 RX ADMIN — PROPOFOL 150 MG: 10 INJECTION, EMULSION INTRAVENOUS at 00:33

## 2025-08-18 RX ADMIN — FENTANYL CITRATE 50 MCG: 50 INJECTION, SOLUTION INTRAMUSCULAR; INTRAVENOUS at 00:31

## 2025-08-18 RX ADMIN — HYDROMORPHONE HYDROCHLORIDE 1 MG: 2 INJECTION INTRAMUSCULAR; INTRAVENOUS; SUBCUTANEOUS at 00:54

## 2025-08-18 RX ADMIN — DEXMEDETOMIDINE 10 MCG: 100 INJECTION, SOLUTION INTRAVENOUS at 00:54

## 2025-08-18 RX ADMIN — EPHEDRINE SULFATE 10 MG: 50 INJECTION, SOLUTION INTRAVENOUS at 01:38

## 2025-08-18 RX ADMIN — FUROSEMIDE 40 MG: 10 INJECTION, SOLUTION INTRAVENOUS at 03:39

## 2025-08-18 RX ADMIN — EPHEDRINE SULFATE 10 MG: 50 INJECTION, SOLUTION INTRAVENOUS at 01:36

## 2025-08-18 RX ADMIN — CEFAZOLIN 2 G: 1 INJECTION, POWDER, FOR SOLUTION INTRAMUSCULAR; INTRAVENOUS at 00:35

## 2025-08-18 RX ADMIN — HUMAN ALBUMIN MICROSPHERES AND PERFLUTREN 3 ML: 10; .22 INJECTION, SOLUTION INTRAVENOUS at 11:40

## 2025-08-18 RX ADMIN — Medication 100 MCG: at 00:43

## 2025-08-18 RX ADMIN — POTASSIUM CHLORIDE 20 MEQ: 1500 TABLET, EXTENDED RELEASE ORAL at 05:32

## 2025-08-18 RX ADMIN — ASPIRIN 81 MG: 81 TABLET, COATED ORAL at 05:32

## 2025-08-18 ASSESSMENT — COGNITIVE AND FUNCTIONAL STATUS - GENERAL
SUGGESTED CMS G CODE MODIFIER MOBILITY: CH
SUGGESTED CMS G CODE MODIFIER DAILY ACTIVITY: CH
DAILY ACTIVITIY SCORE: 24
MOBILITY SCORE: 24

## 2025-08-18 ASSESSMENT — PAIN DESCRIPTION - PAIN TYPE
TYPE: ACUTE PAIN
TYPE: SURGICAL PAIN
TYPE: SURGICAL PAIN
TYPE: ACUTE PAIN;SURGICAL PAIN
TYPE: SURGICAL PAIN
TYPE: ACUTE PAIN
TYPE: ACUTE PAIN
TYPE: ACUTE PAIN;SURGICAL PAIN
TYPE: SURGICAL PAIN
TYPE: ACUTE PAIN
TYPE: SURGICAL PAIN
TYPE: SURGICAL PAIN

## 2025-08-18 ASSESSMENT — PAIN SCALES - GENERAL: PAIN_LEVEL: 0

## 2025-08-18 ASSESSMENT — COPD QUESTIONNAIRES
DURING THE PAST 4 WEEKS HOW MUCH DID YOU FEEL SHORT OF BREATH: NONE/LITTLE OF THE TIME
COPD SCREENING SCORE: 3
HAVE YOU SMOKED AT LEAST 100 CIGARETTES IN YOUR ENTIRE LIFE: YES
DO YOU EVER COUGH UP ANY MUCUS OR PHLEGM?: NO/ONLY WITH OCCASIONAL COLDS OR INFECTIONS

## 2025-08-18 ASSESSMENT — ENCOUNTER SYMPTOMS
CARDIOVASCULAR NEGATIVE: 1
CHILLS: 1
WEAKNESS: 0
MUSCULOSKELETAL NEGATIVE: 1
DIARRHEA: 1
WHEEZING: 0
HEADACHES: 1
BLOOD IN STOOL: 0
PSYCHIATRIC NEGATIVE: 1
DIZZINESS: 1
SHORTNESS OF BREATH: 1
ABDOMINAL PAIN: 1
VOMITING: 1
EYES NEGATIVE: 1
NAUSEA: 1

## 2025-08-20 ENCOUNTER — HOSPITAL ENCOUNTER (OUTPATIENT)
Dept: LAB | Facility: MEDICAL CENTER | Age: 55
End: 2025-08-20
Attending: INTERNAL MEDICINE
Payer: MEDICAID

## 2025-08-20 LAB
ERYTHROCYTE [DISTWIDTH] IN BLOOD BY AUTOMATED COUNT: 46 FL (ref 35.9–50)
HCT VFR BLD AUTO: 52.6 % (ref 42–52)
HGB BLD-MCNC: 18.2 G/DL (ref 14–18)
MCH RBC QN AUTO: 30.2 PG (ref 27–33)
MCHC RBC AUTO-ENTMCNC: 34.6 G/DL (ref 32.3–36.5)
MCV RBC AUTO: 87.2 FL (ref 81.4–97.8)
PLATELET # BLD AUTO: 298 K/UL (ref 164–446)
PMV BLD AUTO: 10.1 FL (ref 9–12.9)
RBC # BLD AUTO: 6.03 M/UL (ref 4.7–6.1)
WBC # BLD AUTO: 9.7 K/UL (ref 4.8–10.8)

## 2025-08-20 PROCEDURE — 85027 COMPLETE CBC AUTOMATED: CPT

## 2025-08-20 PROCEDURE — 36415 COLL VENOUS BLD VENIPUNCTURE: CPT

## (undated) DEVICE — CANNULA W/SEAL 5X100 Z-THRE - ADED KII (12/BX)

## (undated) DEVICE — GLOVE SZ 7 BIOGEL PI MICRO - PF LF (50PR/BX 4BX/CA)

## (undated) DEVICE — SUTURE GENERAL

## (undated) DEVICE — VESSEL DIVIDER SEAL LAP LIGASURE 37CM (6EA/BX)

## (undated) DEVICE — BAG RETRIEVAL 10ML (10EA/BX)

## (undated) DEVICE — CANISTER SUCTION 3000ML MECHANICAL FILTER AUTO SHUTOFF MEDI-VAC NONSTERILE LF DISP (40EA/CA)

## (undated) DEVICE — TROCAR LAPSCP 100MM 12MM NTHRD - (6/BX)

## (undated) DEVICE — PAD GROUNDING BOVIE PEDS - (25/CA)

## (undated) DEVICE — TROCAR Z THREAD 12 X 100 - BLADED (6/BX)

## (undated) DEVICE — ELECTRODE DUAL RETURN W/ CORD - (50/PK)

## (undated) DEVICE — SODIUM CHL IRRIGATION 0.9% 1000ML (12EA/CA)

## (undated) DEVICE — SENSOR OXIMETER ADULT SPO2 RD SET (20EA/BX)

## (undated) DEVICE — SET EXTENSION WITH 2 PORTS (48EA/CA) ***PART #2C8610 IS A SUBSTITUTE*****

## (undated) DEVICE — STAPLE 45MM VASCULAR WHITE 2.5MM (12EA/BX)

## (undated) DEVICE — SUCTION INSTRUMENT YANKAUER BULBOUS TIP W/O VENT (50EA/CA)

## (undated) DEVICE — SUTURE 0 VICRYL PLUS UR-6 - 27 INCH (36/BX)

## (undated) DEVICE — SET LEADWIRE 5 LEAD BEDSIDE DISPOSABLE ECG (1SET OF 5/EA)

## (undated) DEVICE — LACTATED RINGERS INJ 1000 ML - (14EA/CA 60CA/PF)

## (undated) DEVICE — Device

## (undated) DEVICE — GOWN WARMING STANDARD FLEX - (30/CA)

## (undated) DEVICE — TUBING CLEARLINK DUO-VENT - C-FLO (48EA/CA)

## (undated) DEVICE — SUTURE 4-0 VICRYL PLUS FS-2 - 27 INCH (36/BX)

## (undated) DEVICE — COVER LIGHT HANDLE ALC PLUS DISP (18EA/BX)

## (undated) DEVICE — GLOVE BIOGEL PI INDICATOR SZ 7.5 SURGICAL PF LF -(50/BX 4BX/CA)